# Patient Record
Sex: FEMALE | Race: WHITE | NOT HISPANIC OR LATINO | Employment: OTHER | URBAN - METROPOLITAN AREA
[De-identification: names, ages, dates, MRNs, and addresses within clinical notes are randomized per-mention and may not be internally consistent; named-entity substitution may affect disease eponyms.]

---

## 2019-02-14 ENCOUNTER — APPOINTMENT (OUTPATIENT)
Dept: MRI IMAGING | Facility: HOSPITAL | Age: 74
End: 2019-02-14

## 2019-02-14 ENCOUNTER — HOSPITAL ENCOUNTER (EMERGENCY)
Facility: HOSPITAL | Age: 74
Discharge: HOME | End: 2019-02-14
Attending: EMERGENCY MEDICINE | Admitting: EMERGENCY MEDICINE

## 2019-02-14 VITALS
BODY MASS INDEX: 28.17 KG/M2 | DIASTOLIC BLOOD PRESSURE: 82 MMHG | TEMPERATURE: 98.4 F | OXYGEN SATURATION: 96 % | SYSTOLIC BLOOD PRESSURE: 163 MMHG | HEIGHT: 64 IN | WEIGHT: 165 LBS | RESPIRATION RATE: 16 BRPM | HEART RATE: 61 BPM

## 2019-02-14 DIAGNOSIS — I10 ELEVATED BLOOD PRESSURE READING WITH DIAGNOSIS OF HYPERTENSION: Primary | ICD-10-CM

## 2019-02-14 DIAGNOSIS — G45.3 AMAUROSIS FUGAX OF LEFT EYE: ICD-10-CM

## 2019-02-14 LAB
ALBUMIN SERPL-MCNC: 4.15 G/DL (ref 3.2–4.8)
ALBUMIN/GLOB SERPL: 1.6 G/DL (ref 1.5–2.5)
ALP SERPL-CCNC: 96 U/L (ref 25–100)
ALT SERPL W P-5'-P-CCNC: 40 U/L (ref 7–40)
ANION GAP SERPL CALCULATED.3IONS-SCNC: 4 MMOL/L (ref 3–11)
AST SERPL-CCNC: 28 U/L (ref 0–33)
BACTERIA UR QL AUTO: ABNORMAL /HPF
BASOPHILS # BLD AUTO: 0.03 10*3/MM3 (ref 0–0.2)
BASOPHILS NFR BLD AUTO: 0.5 % (ref 0–1)
BILIRUB SERPL-MCNC: 0.7 MG/DL (ref 0.3–1.2)
BILIRUB UR QL STRIP: NEGATIVE
BUN BLD-MCNC: 13 MG/DL (ref 9–23)
BUN/CREAT SERPL: 15.3 (ref 7–25)
CALCIUM SPEC-SCNC: 10.2 MG/DL (ref 8.7–10.4)
CHLORIDE SERPL-SCNC: 106 MMOL/L (ref 99–109)
CLARITY UR: CLEAR
CO2 SERPL-SCNC: 30 MMOL/L (ref 20–31)
COLOR UR: YELLOW
CREAT BLD-MCNC: 0.85 MG/DL (ref 0.6–1.3)
DEPRECATED RDW RBC AUTO: 44.9 FL (ref 37–54)
EOSINOPHIL # BLD AUTO: 0.06 10*3/MM3 (ref 0–0.3)
EOSINOPHIL NFR BLD AUTO: 1.1 % (ref 0–3)
ERYTHROCYTE [DISTWIDTH] IN BLOOD BY AUTOMATED COUNT: 13.6 % (ref 11.3–14.5)
GFR SERPL CREATININE-BSD FRML MDRD: 65 ML/MIN/1.73
GLOBULIN UR ELPH-MCNC: 2.7 GM/DL
GLUCOSE BLD-MCNC: 94 MG/DL (ref 70–100)
GLUCOSE UR STRIP-MCNC: NEGATIVE MG/DL
HCT VFR BLD AUTO: 46.1 % (ref 34.5–44)
HGB BLD-MCNC: 15 G/DL (ref 11.5–15.5)
HGB UR QL STRIP.AUTO: NEGATIVE
HYALINE CASTS UR QL AUTO: ABNORMAL /LPF
IMM GRANULOCYTES # BLD AUTO: 0.01 10*3/MM3 (ref 0–0.05)
IMM GRANULOCYTES NFR BLD AUTO: 0.2 % (ref 0–0.6)
KETONES UR QL STRIP: NEGATIVE
LEUKOCYTE ESTERASE UR QL STRIP.AUTO: ABNORMAL
LYMPHOCYTES # BLD AUTO: 1.61 10*3/MM3 (ref 0.6–4.8)
LYMPHOCYTES NFR BLD AUTO: 29.5 % (ref 24–44)
MCH RBC QN AUTO: 29.5 PG (ref 27–31)
MCHC RBC AUTO-ENTMCNC: 32.5 G/DL (ref 32–36)
MCV RBC AUTO: 90.6 FL (ref 80–99)
MONOCYTES # BLD AUTO: 0.44 10*3/MM3 (ref 0–1)
MONOCYTES NFR BLD AUTO: 8.1 % (ref 0–12)
NEUTROPHILS # BLD AUTO: 3.32 10*3/MM3 (ref 1.5–8.3)
NEUTROPHILS NFR BLD AUTO: 60.8 % (ref 41–71)
NITRITE UR QL STRIP: NEGATIVE
PH UR STRIP.AUTO: 7.5 [PH] (ref 5–8)
PLATELET # BLD AUTO: 243 10*3/MM3 (ref 150–450)
PMV BLD AUTO: 12.3 FL (ref 6–12)
POTASSIUM BLD-SCNC: 4.2 MMOL/L (ref 3.5–5.5)
PROT SERPL-MCNC: 6.8 G/DL (ref 5.7–8.2)
PROT UR QL STRIP: NEGATIVE
RBC # BLD AUTO: 5.09 10*6/MM3 (ref 3.89–5.14)
RBC # UR: ABNORMAL /HPF
REF LAB TEST METHOD: ABNORMAL
SODIUM BLD-SCNC: 140 MMOL/L (ref 132–146)
SP GR UR STRIP: 1.01 (ref 1–1.03)
SQUAMOUS #/AREA URNS HPF: ABNORMAL /HPF
TROPONIN I SERPL-MCNC: <0.006 NG/ML
UROBILINOGEN UR QL STRIP: ABNORMAL
WBC NRBC COR # BLD: 5.46 10*3/MM3 (ref 3.5–10.8)
WBC UR QL AUTO: ABNORMAL /HPF

## 2019-02-14 PROCEDURE — 70548 MR ANGIOGRAPHY NECK W/DYE: CPT

## 2019-02-14 PROCEDURE — 93005 ELECTROCARDIOGRAM TRACING: CPT | Performed by: EMERGENCY MEDICINE

## 2019-02-14 PROCEDURE — 81001 URINALYSIS AUTO W/SCOPE: CPT | Performed by: EMERGENCY MEDICINE

## 2019-02-14 PROCEDURE — 70551 MRI BRAIN STEM W/O DYE: CPT

## 2019-02-14 PROCEDURE — 99284 EMERGENCY DEPT VISIT MOD MDM: CPT

## 2019-02-14 PROCEDURE — 0 GADOBENATE DIMEGLUMINE 529 MG/ML SOLUTION: Performed by: EMERGENCY MEDICINE

## 2019-02-14 PROCEDURE — A9577 INJ MULTIHANCE: HCPCS | Performed by: EMERGENCY MEDICINE

## 2019-02-14 PROCEDURE — 70544 MR ANGIOGRAPHY HEAD W/O DYE: CPT

## 2019-02-14 PROCEDURE — 85025 COMPLETE CBC W/AUTO DIFF WBC: CPT | Performed by: EMERGENCY MEDICINE

## 2019-02-14 PROCEDURE — 80053 COMPREHEN METABOLIC PANEL: CPT | Performed by: EMERGENCY MEDICINE

## 2019-02-14 PROCEDURE — 84484 ASSAY OF TROPONIN QUANT: CPT | Performed by: EMERGENCY MEDICINE

## 2019-02-14 RX ORDER — VALSARTAN 160 MG/1
160 TABLET ORAL ONCE
Status: COMPLETED | OUTPATIENT
Start: 2019-02-14 | End: 2019-02-14

## 2019-02-14 RX ORDER — LOSARTAN POTASSIUM 25 MG/1
50 TABLET ORAL 2 TIMES DAILY
Qty: 60 TABLET | Refills: 0 | Status: SHIPPED | OUTPATIENT
Start: 2019-02-14

## 2019-02-14 RX ORDER — LABETALOL HYDROCHLORIDE 5 MG/ML
20 INJECTION, SOLUTION INTRAVENOUS ONCE
Status: DISCONTINUED | OUTPATIENT
Start: 2019-02-14 | End: 2019-02-14 | Stop reason: HOSPADM

## 2019-02-14 RX ADMIN — GADOBENATE DIMEGLUMINE 15 ML: 529 INJECTION, SOLUTION INTRAVENOUS at 13:49

## 2019-02-14 RX ADMIN — VALSARTAN 160 MG: 160 TABLET, FILM COATED ORAL at 12:06

## 2019-02-14 NOTE — ED PROVIDER NOTES
"Subjective   Oralia Woodruff is a 73 y.o.female with a history of HTN who presents to the ED with complaints of hypertension. Mrs. Woodruff states that she has been off of blood pressure medication for quite some time since discontinuing Valsartan. She was dieting and exercising and she said she had been rather healthy until this past month. For the past month, she says her blood pressure has been \"out of bounds\" and she feels very \"shaky\" today. She also notes that her heart was quivering last night. She also mentions that she has had 2 syncopal episodes  in the past month.  She has had 2 episodes of darkening of the vision in her left eye. This later resolved. She followed up with an eye specialist in [place] two days ago and was told she had a blood clot behind her left eye. She is scheduled to have a study of her carotid artery tomorrow. The patient says she has had complications taking HTN medication in the past. Her blood pressure ranges from 160-170 systolic and 80-90 diastolic on a good day. She has a history of a cholecystectomy. She used to take a fluid pill. She does have a family history of CVA. There are no other acute complaints at this time.         History provided by:  Patient  Hypertension   Severity:  Moderate  Onset quality:  Gradual  Duration:  1 month  Timing:  Constant  Progression:  Worsening  Chronicity:  Recurrent  Time since last dose of antihypertensive: a long time.  Notable PTA blood pressures:  160-170/80-90 on a good day  Context: not OTC medications used    Relieved by:  Nothing  Ineffective treatments:  Diet  Associated symptoms: blurred vision (cloudy), loss of consciousness (twice in the past month), palpitations, syncope and vomiting        Review of Systems   Constitutional:        She feels \"shaky\"    Eyes: Positive for blurred vision (cloudy) and visual disturbance.   Cardiovascular: Positive for palpitations and syncope.   Gastrointestinal: Positive for vomiting. "   Neurological: Positive for loss of consciousness (twice in the past month) and syncope.   All other systems reviewed and are negative.      Past Medical History:   Diagnosis Date   • Hypertension        Allergies   Allergen Reactions   • Iodine Other (See Comments)     NO IDEA         Past Surgical History:   Procedure Laterality Date   • CHOLECYSTECTOMY     • EYE SURGERY     • HYSTERECTOMY         History reviewed. No pertinent family history.    Social History     Socioeconomic History   • Marital status:      Spouse name: Not on file   • Number of children: Not on file   • Years of education: Not on file   • Highest education level: Not on file   Tobacco Use   • Smoking status: Never Smoker   • Smokeless tobacco: Never Used   Substance and Sexual Activity   • Alcohol use: No     Frequency: Never   • Drug use: No   • Sexual activity: Defer         Objective   Physical Exam   Constitutional: She is oriented to person, place, and time. She appears well-developed and well-nourished. No distress.   HENT:   Head: Normocephalic and atraumatic.   Nose: Nose normal.   Eyes: Conjunctivae are normal. No scleral icterus.   Neck: Normal range of motion. Neck supple.   Cardiovascular: Normal rate, regular rhythm and normal heart sounds.   No murmur heard.  Pulmonary/Chest: Effort normal and breath sounds normal. No respiratory distress.   Abdominal: Soft. Bowel sounds are normal. There is no tenderness.   Musculoskeletal: Normal range of motion. She exhibits no edema.   Neurological: She is alert and oriented to person, place, and time.   Skin: Skin is warm and dry.   Psychiatric: She has a normal mood and affect. Her behavior is normal.   Nursing note and vitals reviewed.      Procedures         ED Course  ED Course as of Feb 14 2132   Thu Feb 14, 2019   1148 She reports 2 episodes of amaurosis fugax over the last month as well as several syncopal episodes.  We'll obtain MRI of her brain as well as MRI angiograms   [DT]   1407 Reevaluated Mrs. Woodruff at bedside and updated her on findings and plan for further care.   [TJ]   1425 I spoke with Mrs. Woodruff and her family about MRI findings.  Blood pressure is 169/89 with a heart rate of 62.  She is concerned about the potentially cancer causing ingredients and valsartan.  We will change her to losartan.  I encouraged her to begin taking a baby aspirin daily.  She reports that she has numerous sensitivities to most blood pressure medications.  We are awaiting troponin and electrolytes, once I reviewed those will discharge if no significant findings are seen  [DT]      ED Course User Index  [DT] Viet Gillette MD  [TJ] Luis Pedroza                     [facility]  Number of Diagnoses or Management Options  Amaurosis fugax of left eye: new and requires workup  Elevated blood pressure reading with diagnosis of hypertension: new and requires workup     Amount and/or Complexity of Data Reviewed  Clinical lab tests: ordered and reviewed  Tests in the radiology section of CPT®: ordered and reviewed  Independent visualization of images, tracings, or specimens: yes    Patient Progress  Patient progress: improved      Final diagnoses:   Elevated blood pressure reading with diagnosis of hypertension   Amaurosis fugax of left eye       Documentation assistance provided by fish Pedroza.  Information recorded by the fish was done at my direction and has been verified and validated by me.     Luis Pedroza  02/14/19 1152       Luis Pedroza  02/14/19 1312       Viet Gillette MD  02/14/19 [id number]

## 2019-02-14 NOTE — DISCHARGE INSTRUCTIONS
Begin taking a baby aspirin daily.  Monitor your blood pressure at home and report it to your primary care provider upon follow-up

## 2024-12-27 ENCOUNTER — APPOINTMENT (OUTPATIENT)
Dept: CT IMAGING | Facility: HOSPITAL | Age: 79
End: 2024-12-27
Payer: MEDICARE

## 2024-12-27 ENCOUNTER — HOSPITAL ENCOUNTER (INPATIENT)
Facility: HOSPITAL | Age: 79
LOS: 6 days | Discharge: HOME | End: 2025-01-03
Attending: EMERGENCY MEDICINE | Admitting: INTERNAL MEDICINE
Payer: MEDICARE

## 2024-12-27 DIAGNOSIS — R68.89 DECREASED ACTIVITY TOLERANCE: ICD-10-CM

## 2024-12-27 DIAGNOSIS — K86.89 PANCREATIC MASS: Primary | ICD-10-CM

## 2024-12-27 DIAGNOSIS — R10.9 ACUTE ABDOMINAL PAIN: ICD-10-CM

## 2024-12-27 DIAGNOSIS — R63.4 WEIGHT LOSS: ICD-10-CM

## 2024-12-27 LAB
ALBUMIN SERPL-MCNC: 3.9 G/DL (ref 3.5–5.2)
ALBUMIN/GLOB SERPL: 1.4 G/DL
ALP SERPL-CCNC: 440 U/L (ref 39–117)
ALT SERPL W P-5'-P-CCNC: 10 U/L (ref 1–33)
ANION GAP SERPL CALCULATED.3IONS-SCNC: 10 MMOL/L (ref 5–15)
AST SERPL-CCNC: 20 U/L (ref 1–32)
BACTERIA UR QL AUTO: NORMAL /HPF
BASOPHILS # BLD AUTO: 0.03 10*3/MM3 (ref 0–0.2)
BASOPHILS NFR BLD AUTO: 0.5 % (ref 0–1.5)
BILIRUB SERPL-MCNC: 0.3 MG/DL (ref 0–1.2)
BILIRUB UR QL STRIP: NEGATIVE
BUN SERPL-MCNC: 39 MG/DL (ref 8–23)
BUN/CREAT SERPL: 24.4 (ref 7–25)
CALCIUM SPEC-SCNC: 12.6 MG/DL (ref 8.6–10.5)
CHLORIDE SERPL-SCNC: 105 MMOL/L (ref 98–107)
CLARITY UR: CLEAR
CO2 SERPL-SCNC: 20 MMOL/L (ref 22–29)
COLOR UR: YELLOW
CREAT BLDA-MCNC: 1.7 MG/DL (ref 0.6–1.3)
CREAT SERPL-MCNC: 1.6 MG/DL (ref 0.57–1)
D-LACTATE SERPL-SCNC: 0.9 MMOL/L (ref 0.5–2)
DEPRECATED RDW RBC AUTO: 47.1 FL (ref 37–54)
EGFRCR SERPLBLD CKD-EPI 2021: 32.5 ML/MIN/1.73
EOSINOPHIL # BLD AUTO: 0.03 10*3/MM3 (ref 0–0.4)
EOSINOPHIL NFR BLD AUTO: 0.5 % (ref 0.3–6.2)
ERYTHROCYTE [DISTWIDTH] IN BLOOD BY AUTOMATED COUNT: 13.5 % (ref 12.3–15.4)
GEN 5 1HR TROPONIN T REFLEX: 17 NG/L
GLOBULIN UR ELPH-MCNC: 2.8 GM/DL
GLUCOSE SERPL-MCNC: 94 MG/DL (ref 65–99)
GLUCOSE UR STRIP-MCNC: ABNORMAL MG/DL
GRAN CASTS URNS QL MICRO: NORMAL /LPF
HCT VFR BLD AUTO: 37.5 % (ref 34–46.6)
HGB BLD-MCNC: 11.9 G/DL (ref 12–15.9)
HGB UR QL STRIP.AUTO: NEGATIVE
HOLD SPECIMEN: NORMAL
HYALINE CASTS UR QL AUTO: NORMAL /LPF
IMM GRANULOCYTES # BLD AUTO: 0.01 10*3/MM3 (ref 0–0.05)
IMM GRANULOCYTES NFR BLD AUTO: 0.2 % (ref 0–0.5)
KETONES UR QL STRIP: NEGATIVE
LEUKOCYTE ESTERASE UR QL STRIP.AUTO: NEGATIVE
LIPASE SERPL-CCNC: 86 U/L (ref 13–60)
LYMPHOCYTES # BLD AUTO: 1.37 10*3/MM3 (ref 0.7–3.1)
LYMPHOCYTES NFR BLD AUTO: 21.9 % (ref 19.6–45.3)
MCH RBC QN AUTO: 30.1 PG (ref 26.6–33)
MCHC RBC AUTO-ENTMCNC: 31.7 G/DL (ref 31.5–35.7)
MCV RBC AUTO: 94.7 FL (ref 79–97)
MONOCYTES # BLD AUTO: 0.38 10*3/MM3 (ref 0.1–0.9)
MONOCYTES NFR BLD AUTO: 6.1 % (ref 5–12)
NEUTROPHILS NFR BLD AUTO: 4.45 10*3/MM3 (ref 1.7–7)
NEUTROPHILS NFR BLD AUTO: 70.8 % (ref 42.7–76)
NITRITE UR QL STRIP: NEGATIVE
NRBC BLD AUTO-RTO: 0 /100 WBC (ref 0–0.2)
PH UR STRIP.AUTO: 6 [PH] (ref 5–8)
PLATELET # BLD AUTO: 207 10*3/MM3 (ref 140–450)
PMV BLD AUTO: 10.9 FL (ref 6–12)
POTASSIUM SERPL-SCNC: 4.7 MMOL/L (ref 3.5–5.2)
PROT SERPL-MCNC: 6.7 G/DL (ref 6–8.5)
PROT UR QL STRIP: ABNORMAL
RBC # BLD AUTO: 3.96 10*6/MM3 (ref 3.77–5.28)
RBC # UR STRIP: NORMAL /HPF
REF LAB TEST METHOD: NORMAL
SODIUM SERPL-SCNC: 135 MMOL/L (ref 136–145)
SP GR UR STRIP: 1.01 (ref 1–1.03)
SQUAMOUS #/AREA URNS HPF: NORMAL /HPF
TROPONIN T % DELTA: 6 %
TROPONIN T NUMERIC DELTA: 1 NG/L
TROPONIN T SERPL HS-MCNC: 16 NG/L
UROBILINOGEN UR QL STRIP: ABNORMAL
WBC # UR STRIP: NORMAL /HPF
WBC NRBC COR # BLD AUTO: 6.27 10*3/MM3 (ref 3.4–10.8)
WHOLE BLOOD HOLD COAG: NORMAL
WHOLE BLOOD HOLD SPECIMEN: NORMAL

## 2024-12-27 PROCEDURE — 99223 1ST HOSP IP/OBS HIGH 75: CPT | Performed by: STUDENT IN AN ORGANIZED HEALTH CARE EDUCATION/TRAINING PROGRAM

## 2024-12-27 PROCEDURE — G0378 HOSPITAL OBSERVATION PER HR: HCPCS

## 2024-12-27 PROCEDURE — 83605 ASSAY OF LACTIC ACID: CPT | Performed by: EMERGENCY MEDICINE

## 2024-12-27 PROCEDURE — 71275 CT ANGIOGRAPHY CHEST: CPT

## 2024-12-27 PROCEDURE — 93005 ELECTROCARDIOGRAM TRACING: CPT | Performed by: NURSE PRACTITIONER

## 2024-12-27 PROCEDURE — 63710000001 ONDANSETRON ODT 4 MG TABLET DISPERSIBLE: Performed by: EMERGENCY MEDICINE

## 2024-12-27 PROCEDURE — 85025 COMPLETE CBC W/AUTO DIFF WBC: CPT | Performed by: EMERGENCY MEDICINE

## 2024-12-27 PROCEDURE — 25510000001 IOPAMIDOL PER 1 ML: Performed by: EMERGENCY MEDICINE

## 2024-12-27 PROCEDURE — 25810000003 SODIUM CHLORIDE 0.9 % SOLUTION: Performed by: NURSE PRACTITIONER

## 2024-12-27 PROCEDURE — 84484 ASSAY OF TROPONIN QUANT: CPT | Performed by: NURSE PRACTITIONER

## 2024-12-27 PROCEDURE — 82565 ASSAY OF CREATININE: CPT

## 2024-12-27 PROCEDURE — 80053 COMPREHEN METABOLIC PANEL: CPT | Performed by: EMERGENCY MEDICINE

## 2024-12-27 PROCEDURE — 36415 COLL VENOUS BLD VENIPUNCTURE: CPT

## 2024-12-27 PROCEDURE — 86301 IMMUNOASSAY TUMOR CA 19-9: CPT | Performed by: STUDENT IN AN ORGANIZED HEALTH CARE EDUCATION/TRAINING PROGRAM

## 2024-12-27 PROCEDURE — 74177 CT ABD & PELVIS W/CONTRAST: CPT

## 2024-12-27 PROCEDURE — 99285 EMERGENCY DEPT VISIT HI MDM: CPT

## 2024-12-27 PROCEDURE — 83690 ASSAY OF LIPASE: CPT | Performed by: EMERGENCY MEDICINE

## 2024-12-27 PROCEDURE — 81001 URINALYSIS AUTO W/SCOPE: CPT | Performed by: EMERGENCY MEDICINE

## 2024-12-27 RX ORDER — ACETAMINOPHEN 650 MG/1
650 SUPPOSITORY RECTAL EVERY 4 HOURS PRN
Status: DISCONTINUED | OUTPATIENT
Start: 2024-12-27 | End: 2025-01-03 | Stop reason: HOSPADM

## 2024-12-27 RX ORDER — DEXAMETHASONE SODIUM PHOSPHATE 10 MG/ML
6 INJECTION INTRAMUSCULAR; INTRAVENOUS ONCE
Status: DISCONTINUED | OUTPATIENT
Start: 2024-12-27 | End: 2024-12-27

## 2024-12-27 RX ORDER — SODIUM CHLORIDE 9 MG/ML
40 INJECTION, SOLUTION INTRAVENOUS AS NEEDED
Status: DISCONTINUED | OUTPATIENT
Start: 2024-12-27 | End: 2025-01-03 | Stop reason: HOSPADM

## 2024-12-27 RX ORDER — SODIUM CHLORIDE 9 MG/ML
10 INJECTION, SOLUTION INTRAMUSCULAR; INTRAVENOUS; SUBCUTANEOUS AS NEEDED
Status: DISCONTINUED | OUTPATIENT
Start: 2024-12-27 | End: 2025-01-03 | Stop reason: HOSPADM

## 2024-12-27 RX ORDER — DIPHENHYDRAMINE HYDROCHLORIDE 50 MG/ML
12.5 INJECTION INTRAMUSCULAR; INTRAVENOUS ONCE
Status: DISCONTINUED | OUTPATIENT
Start: 2024-12-27 | End: 2024-12-28

## 2024-12-27 RX ORDER — ACETAMINOPHEN 160 MG/5ML
650 SOLUTION ORAL EVERY 4 HOURS PRN
Status: DISCONTINUED | OUTPATIENT
Start: 2024-12-27 | End: 2025-01-03 | Stop reason: HOSPADM

## 2024-12-27 RX ORDER — SODIUM CHLORIDE 0.9 % (FLUSH) 0.9 %
10 SYRINGE (ML) INJECTION AS NEEDED
Status: DISCONTINUED | OUTPATIENT
Start: 2024-12-27 | End: 2025-01-03 | Stop reason: HOSPADM

## 2024-12-27 RX ORDER — ONDANSETRON 4 MG/1
4 TABLET, ORALLY DISINTEGRATING ORAL ONCE
Status: COMPLETED | OUTPATIENT
Start: 2024-12-27 | End: 2024-12-27

## 2024-12-27 RX ORDER — ACETAMINOPHEN 325 MG/1
650 TABLET ORAL EVERY 4 HOURS PRN
Status: DISCONTINUED | OUTPATIENT
Start: 2024-12-27 | End: 2025-01-03 | Stop reason: HOSPADM

## 2024-12-27 RX ORDER — NITROGLYCERIN 0.4 MG/1
0.4 TABLET SUBLINGUAL
Status: DISCONTINUED | OUTPATIENT
Start: 2024-12-27 | End: 2025-01-03 | Stop reason: HOSPADM

## 2024-12-27 RX ORDER — FAMOTIDINE 10 MG/ML
20 INJECTION, SOLUTION INTRAVENOUS ONCE
Status: DISCONTINUED | OUTPATIENT
Start: 2024-12-27 | End: 2024-12-28

## 2024-12-27 RX ORDER — IOPAMIDOL 755 MG/ML
85 INJECTION, SOLUTION INTRAVASCULAR
Status: COMPLETED | OUTPATIENT
Start: 2024-12-27 | End: 2024-12-27

## 2024-12-27 RX ORDER — SODIUM CHLORIDE 0.9 % (FLUSH) 0.9 %
10 SYRINGE (ML) INJECTION EVERY 12 HOURS SCHEDULED
Status: DISCONTINUED | OUTPATIENT
Start: 2024-12-27 | End: 2025-01-03 | Stop reason: HOSPADM

## 2024-12-27 RX ORDER — HYDROCODONE BITARTRATE AND ACETAMINOPHEN 5; 325 MG/1; MG/1
1 TABLET ORAL ONCE
Status: COMPLETED | OUTPATIENT
Start: 2024-12-27 | End: 2024-12-27

## 2024-12-27 RX ORDER — ONDANSETRON 2 MG/ML
4 INJECTION INTRAMUSCULAR; INTRAVENOUS EVERY 6 HOURS PRN
Status: DISCONTINUED | OUTPATIENT
Start: 2024-12-27 | End: 2025-01-03 | Stop reason: HOSPADM

## 2024-12-27 RX ADMIN — HYDROCODONE BITARTRATE AND ACETAMINOPHEN 1 TABLET: 5; 325 TABLET ORAL at 16:38

## 2024-12-27 RX ADMIN — IOPAMIDOL 85 ML: 755 INJECTION, SOLUTION INTRAVENOUS at 18:22

## 2024-12-27 RX ADMIN — ONDANSETRON 4 MG: 4 TABLET, ORALLY DISINTEGRATING ORAL at 16:38

## 2024-12-27 RX ADMIN — SODIUM CHLORIDE 1000 ML: 9 INJECTION, SOLUTION INTRAVENOUS at 17:25

## 2024-12-27 NOTE — ED PROVIDER NOTES
Subjective   History of Present Illness  Pleasant patient presents the ER for severe right lower quad abdominal pain does radiate into her back.  She has had weight loss.  Symptoms have been going on for several weeks to months but getting much worse recently.  To the point where she has difficulty ambulating.  Accompanied by family.  Does not live close by.  Around 2 hours away.        Review of Systems    Past Medical History:   Diagnosis Date    Hypertension        No Active Allergies      Past Surgical History:   Procedure Laterality Date    CHOLECYSTECTOMY      EYE SURGERY      HYSTERECTOMY         No family history on file.    Social History     Socioeconomic History    Marital status:    Tobacco Use    Smoking status: Never    Smokeless tobacco: Never   Substance and Sexual Activity    Alcohol use: No    Drug use: No    Sexual activity: Defer           Objective   Physical Exam  Constitutional:       Appearance: She is well-developed.   HENT:      Head: Normocephalic and atraumatic.      Right Ear: External ear normal.      Left Ear: External ear normal.      Nose: Nose normal.   Eyes:      Conjunctiva/sclera: Conjunctivae normal.      Pupils: Pupils are equal, round, and reactive to light.   Cardiovascular:      Rate and Rhythm: Normal rate and regular rhythm.      Heart sounds: Normal heart sounds.   Pulmonary:      Effort: Pulmonary effort is normal.      Breath sounds: Normal breath sounds.   Abdominal:      General: Bowel sounds are normal.      Palpations: Abdomen is soft.      Tenderness:  in the right lower quadrant   Musculoskeletal:         General: Normal range of motion.      Cervical back: Normal range of motion and neck supple.   Skin:     General: Skin is warm and dry.   Neurological:      Mental Status: She is alert and oriented to person, place, and time.   Psychiatric:         Behavior: Behavior normal.         Thought Content: Thought content normal.         Judgment: Judgment  normal.         Procedures           ED Course  ED Course as of 12/27/24 2010   Fri Dec 27, 2024   1449 Broad differential.  Including bowel obstruction.  Mass colitis constipation.  Bone involvement.  Will need to get scans of her chest abdomen and pelvis lab work urinalysis etc.  Patient evaluated in pit. [JM]   1457 Pt in lobby 2 hours prior to my evaluation and orders. [JM]   1756 Patient has had IV contrast in the past without difficulty.  I had a great discussion with the patient and the family at the bedside.  I do think we need to get IV contrast for better evaluation although is concerned for things like cancer etc.  They do not want to be premedicated.  I think it is reasonable after shared decision making and risk benefits to scan her with IV contrast that she has had in the past without difficulty. [JM]   1957 I had a good conversation with the patient and her daughter.  Concern for pancreatic cancer.  Patient lives 2 hours away.  She is not moving around too much at home related to pain.  Plan on admitting her to the hospital for further evaluation. [JM]      ED Course User Index  [JM] Tristan Tyler, DAXA                                           CT Abdomen Pelvis With Contrast   Final Result   1.No pulmonary embolus.   2.No acute findings within the chest, abdomen or pelvis.   3.1.5 cm x 1.1 cm fluid attenuating cystic lesion of the pancreatic tail consistent with a cystic pancreatic neoplasm.   4.Colonic diverticulosis without evidence of diverticulitis.   5.Additional chronic findings as discussed above.            Electronically Signed: Evens Rojas MD     12/27/2024 6:40 PM EST     Workstation ID: [id number]      CT Angiogram Chest   Final Result   1.No pulmonary embolus.   2.No acute findings within the chest, abdomen or pelvis.   3.1.5 cm x 1.1 cm fluid attenuating cystic lesion of the pancreatic tail consistent with a cystic pancreatic neoplasm.   4.Colonic diverticulosis without evidence of  diverticulitis.   5.Additional chronic findings as discussed above.            Electronically Signed: Evens Rojas MD     12/27/2024 6:40 PM EST     Workstation ID: [id number]                    Medical Decision Making      Final diagnoses:   Pancreatic mass   Acute abdominal pain   Weight loss       ED Disposition  ED Disposition       ED Disposition   Decision to Admit    Condition   --    Comment   --               No follow-up provider specified.       Medication List      No changes were made to your prescriptions during this visit.            Tristan Tyler APRN  12/27/24 2010

## 2024-12-28 ENCOUNTER — APPOINTMENT (OUTPATIENT)
Dept: CARDIOLOGY | Facility: HOSPITAL | Age: 79
End: 2024-12-28
Payer: MEDICARE

## 2024-12-28 LAB
ANION GAP SERPL CALCULATED.3IONS-SCNC: 11 MMOL/L (ref 5–15)
APTT PPP: 49.2 SECONDS (ref 60–90)
ASCENDING AORTA: 3.6 CM
AV MEAN PRESS GRAD SYS DOP V1V2: 4.5 MMHG
AV VMAX SYS DOP: 166 CM/SEC
BASOPHILS # BLD AUTO: 0.03 10*3/MM3 (ref 0–0.2)
BASOPHILS # BLD AUTO: 0.03 10*3/MM3 (ref 0–0.2)
BASOPHILS NFR BLD AUTO: 0.5 % (ref 0–1.5)
BASOPHILS NFR BLD AUTO: 0.6 % (ref 0–1.5)
BH CV ECHO MEAS - AI P1/2T: 729.7 MSEC
BH CV ECHO MEAS - AO MAX PG: 11 MMHG
BH CV ECHO MEAS - AO ROOT DIAM: 3.1 CM
BH CV ECHO MEAS - AO V2 VTI: 30.7 CM
BH CV ECHO MEAS - AVA(I,D): 2.34 CM2
BH CV ECHO MEAS - EDV(CUBED): 85.2 ML
BH CV ECHO MEAS - EDV(MOD-SP2): 57.7 ML
BH CV ECHO MEAS - EDV(MOD-SP4): 98.1 ML
BH CV ECHO MEAS - EF(MOD-SP2): 62.2 %
BH CV ECHO MEAS - EF(MOD-SP4): 60 %
BH CV ECHO MEAS - ESV(CUBED): 8 ML
BH CV ECHO MEAS - ESV(MOD-SP2): 21.8 ML
BH CV ECHO MEAS - ESV(MOD-SP4): 39.2 ML
BH CV ECHO MEAS - FS: 54.5 %
BH CV ECHO MEAS - IVS/LVPW: 0.8 CM
BH CV ECHO MEAS - IVSD: 0.8 CM
BH CV ECHO MEAS - LA DIMENSION: 3.3 CM
BH CV ECHO MEAS - LAT PEAK E' VEL: 13.6 CM/SEC
BH CV ECHO MEAS - LV DIASTOLIC VOL/BSA (35-75): 64.7 CM2
BH CV ECHO MEAS - LV MASS(C)D: 128 GRAMS
BH CV ECHO MEAS - LV MAX PG: 6.1 MMHG
BH CV ECHO MEAS - LV MEAN PG: 2 MMHG
BH CV ECHO MEAS - LV SYSTOLIC VOL/BSA (12-30): 25.8 CM2
BH CV ECHO MEAS - LV V1 MAX: 123 CM/SEC
BH CV ECHO MEAS - LV V1 VTI: 25.3 CM
BH CV ECHO MEAS - LVIDD: 4.4 CM
BH CV ECHO MEAS - LVIDS: 2 CM
BH CV ECHO MEAS - LVOT AREA: 2.8 CM2
BH CV ECHO MEAS - LVOT DIAM: 1.9 CM
BH CV ECHO MEAS - LVPWD: 1 CM
BH CV ECHO MEAS - MED PEAK E' VEL: 9.2 CM/SEC
BH CV ECHO MEAS - MR MAX PG: 100.4 MMHG
BH CV ECHO MEAS - MR MAX VEL: 501 CM/SEC
BH CV ECHO MEAS - MR MEAN PG: 74 MMHG
BH CV ECHO MEAS - MR MEAN VEL: 409 CM/SEC
BH CV ECHO MEAS - MR VTI: 191 CM
BH CV ECHO MEAS - MV A MAX VEL: 127 CM/SEC
BH CV ECHO MEAS - MV DEC SLOPE: 423 CM/SEC2
BH CV ECHO MEAS - MV DEC TIME: 0.2 SEC
BH CV ECHO MEAS - MV E MAX VEL: 92 CM/SEC
BH CV ECHO MEAS - MV E/A: 0.72
BH CV ECHO MEAS - MV MAX PG: 5.1 MMHG
BH CV ECHO MEAS - MV MEAN PG: 2 MMHG
BH CV ECHO MEAS - MV P1/2T: 66.1 MSEC
BH CV ECHO MEAS - MV V2 VTI: 31.7 CM
BH CV ECHO MEAS - MVA(P1/2T): 3.3 CM2
BH CV ECHO MEAS - MVA(VTI): 2.26 CM2
BH CV ECHO MEAS - PA ACC TIME: 0.15 SEC
BH CV ECHO MEAS - PA V2 MAX: 111.5 CM/SEC
BH CV ECHO MEAS - PI END-D VEL: 99.3 CM/SEC
BH CV ECHO MEAS - SV(LVOT): 71.6 ML
BH CV ECHO MEAS - SV(MOD-SP2): 35.9 ML
BH CV ECHO MEAS - SV(MOD-SP4): 58.9 ML
BH CV ECHO MEAS - SVI(LVOT): 47.2 ML/M2
BH CV ECHO MEAS - SVI(MOD-SP2): 23.7 ML/M2
BH CV ECHO MEAS - SVI(MOD-SP4): 38.8 ML/M2
BH CV ECHO MEAS - TAPSE (>1.6): 1.74 CM
BH CV ECHO MEASUREMENTS AVERAGE E/E' RATIO: 8.07
BH CV LOW VAS LEFT SOLEAL VESSEL: 1
BH CV LOW VAS RIGHT PERONEAL VESSEL: 1
BH CV LOW VAS RIGHT POPLITEAL SPONT: 1
BH CV LOW VAS RIGHT VARICOSITY AK VESSEL: 1
BH CV LOWER VASCULAR LEFT COMMON FEMORAL AUGMENT: NORMAL
BH CV LOWER VASCULAR LEFT COMMON FEMORAL COMPRESS: NORMAL
BH CV LOWER VASCULAR LEFT COMMON FEMORAL PHASIC: NORMAL
BH CV LOWER VASCULAR LEFT COMMON FEMORAL SPONT: NORMAL
BH CV LOWER VASCULAR LEFT DISTAL FEMORAL AUGMENT: NORMAL
BH CV LOWER VASCULAR LEFT DISTAL FEMORAL COMPRESS: NORMAL
BH CV LOWER VASCULAR LEFT DISTAL FEMORAL PHASIC: NORMAL
BH CV LOWER VASCULAR LEFT DISTAL FEMORAL SPONT: NORMAL
BH CV LOWER VASCULAR LEFT GASTRONEMIUS COMPRESS: NORMAL
BH CV LOWER VASCULAR LEFT LESSER SAPH COMPRESS: NORMAL
BH CV LOWER VASCULAR LEFT MID FEMORAL AUGMENT: NORMAL
BH CV LOWER VASCULAR LEFT MID FEMORAL COMPRESS: NORMAL
BH CV LOWER VASCULAR LEFT MID FEMORAL PHASIC: NORMAL
BH CV LOWER VASCULAR LEFT MID FEMORAL SPONT: NORMAL
BH CV LOWER VASCULAR LEFT PERONEAL COMPRESS: NORMAL
BH CV LOWER VASCULAR LEFT POPLITEAL AUGMENT: NORMAL
BH CV LOWER VASCULAR LEFT POPLITEAL COMPRESS: NORMAL
BH CV LOWER VASCULAR LEFT POPLITEAL PHASIC: NORMAL
BH CV LOWER VASCULAR LEFT POPLITEAL SPONT: NORMAL
BH CV LOWER VASCULAR LEFT POSTERIOR TIBIAL COMPRESS: NORMAL
BH CV LOWER VASCULAR LEFT PROFUNDA FEMORAL PHASIC: NORMAL
BH CV LOWER VASCULAR LEFT PROFUNDA FEMORAL SPONT: NORMAL
BH CV LOWER VASCULAR LEFT PROXIMAL FEMORAL AUGMENT: NORMAL
BH CV LOWER VASCULAR LEFT PROXIMAL FEMORAL COMPRESS: NORMAL
BH CV LOWER VASCULAR LEFT PROXIMAL FEMORAL PHASIC: NORMAL
BH CV LOWER VASCULAR LEFT PROXIMAL FEMORAL SPONT: NORMAL
BH CV LOWER VASCULAR LEFT SAPHENOFEMORAL JUNCTION AUGMENT: NORMAL
BH CV LOWER VASCULAR LEFT SAPHENOFEMORAL JUNCTION COMPRESS: NORMAL
BH CV LOWER VASCULAR LEFT SAPHENOFEMORAL JUNCTION PHASIC: NORMAL
BH CV LOWER VASCULAR LEFT SAPHENOFEMORAL JUNCTION SPONT: NORMAL
BH CV LOWER VASCULAR LEFT SOLEAL COMPRESS: NORMAL
BH CV LOWER VASCULAR RIGHT COMMON FEMORAL AUGMENT: NORMAL
BH CV LOWER VASCULAR RIGHT COMMON FEMORAL COMPRESS: NORMAL
BH CV LOWER VASCULAR RIGHT COMMON FEMORAL PHASIC: NORMAL
BH CV LOWER VASCULAR RIGHT COMMON FEMORAL SPONT: NORMAL
BH CV LOWER VASCULAR RIGHT DISTAL FEMORAL AUGMENT: NORMAL
BH CV LOWER VASCULAR RIGHT DISTAL FEMORAL COMPRESS: NORMAL
BH CV LOWER VASCULAR RIGHT DISTAL FEMORAL PHASIC: NORMAL
BH CV LOWER VASCULAR RIGHT DISTAL FEMORAL SPONT: NORMAL
BH CV LOWER VASCULAR RIGHT GASTRONEMIUS COMPRESS: NORMAL
BH CV LOWER VASCULAR RIGHT GREATER SAPH AK COMPRESS: NORMAL
BH CV LOWER VASCULAR RIGHT GREATER SAPH BK COMPRESS: NORMAL
BH CV LOWER VASCULAR RIGHT MID FEMORAL AUGMENT: NORMAL
BH CV LOWER VASCULAR RIGHT MID FEMORAL COMPRESS: NORMAL
BH CV LOWER VASCULAR RIGHT MID FEMORAL PHASIC: NORMAL
BH CV LOWER VASCULAR RIGHT MID FEMORAL SPONT: NORMAL
BH CV LOWER VASCULAR RIGHT PERONEAL COMPRESS: NORMAL
BH CV LOWER VASCULAR RIGHT POPLITEAL COMPRESS: NORMAL
BH CV LOWER VASCULAR RIGHT POPLITEAL PHASIC: NORMAL
BH CV LOWER VASCULAR RIGHT POPLITEAL SPONT: NORMAL
BH CV LOWER VASCULAR RIGHT POSTERIOR TIBIAL COMPRESS: NORMAL
BH CV LOWER VASCULAR RIGHT PROFUNDA FEMORAL PHASIC: NORMAL
BH CV LOWER VASCULAR RIGHT PROFUNDA FEMORAL SPONT: NORMAL
BH CV LOWER VASCULAR RIGHT PROXIMAL FEMORAL AUGMENT: NORMAL
BH CV LOWER VASCULAR RIGHT PROXIMAL FEMORAL COMPRESS: NORMAL
BH CV LOWER VASCULAR RIGHT PROXIMAL FEMORAL PHASIC: NORMAL
BH CV LOWER VASCULAR RIGHT PROXIMAL FEMORAL SPONT: NORMAL
BH CV LOWER VASCULAR RIGHT SAPHENOFEMORAL JUNCTION AUGMENT: NORMAL
BH CV LOWER VASCULAR RIGHT SAPHENOFEMORAL JUNCTION COMPRESS: NORMAL
BH CV LOWER VASCULAR RIGHT SAPHENOFEMORAL JUNCTION PHASIC: NORMAL
BH CV LOWER VASCULAR RIGHT SAPHENOFEMORAL JUNCTION SPONT: NORMAL
BH CV LOWER VASCULAR RIGHT VARICOSITY AK COMPRESS: NORMAL
BH CV VAS PRELIMINARY FINDINGS SCRIPTING: 1
BH CV XLRA - RV BASE: 2.6 CM
BH CV XLRA - RV LENGTH: 7.4 CM
BH CV XLRA - RV MID: 1.6 CM
BH CV XLRA - TDI S': 14 CM/SEC
BUN SERPL-MCNC: 36 MG/DL (ref 8–23)
BUN/CREAT SERPL: 23.8 (ref 7–25)
CALCIUM SPEC-SCNC: 12.1 MG/DL (ref 8.6–10.5)
CANCER AG19-9 SERPL-ACNC: 41 U/ML
CHLORIDE SERPL-SCNC: 108 MMOL/L (ref 98–107)
CO2 SERPL-SCNC: 19 MMOL/L (ref 22–29)
CREAT SERPL-MCNC: 1.51 MG/DL (ref 0.57–1)
DEPRECATED RDW RBC AUTO: 46 FL (ref 37–54)
DEPRECATED RDW RBC AUTO: 47.8 FL (ref 37–54)
EGFRCR SERPLBLD CKD-EPI 2021: 34.8 ML/MIN/1.73
EOSINOPHIL # BLD AUTO: 0.1 10*3/MM3 (ref 0–0.4)
EOSINOPHIL # BLD AUTO: 0.12 10*3/MM3 (ref 0–0.4)
EOSINOPHIL NFR BLD AUTO: 2 % (ref 0.3–6.2)
EOSINOPHIL NFR BLD AUTO: 2.1 % (ref 0.3–6.2)
ERYTHROCYTE [DISTWIDTH] IN BLOOD BY AUTOMATED COUNT: 13.3 % (ref 12.3–15.4)
ERYTHROCYTE [DISTWIDTH] IN BLOOD BY AUTOMATED COUNT: 13.5 % (ref 12.3–15.4)
GLUCOSE SERPL-MCNC: 87 MG/DL (ref 65–99)
HCT VFR BLD AUTO: 30.8 % (ref 34–46.6)
HCT VFR BLD AUTO: 32.7 % (ref 34–46.6)
HGB BLD-MCNC: 10.2 G/DL (ref 12–15.9)
HGB BLD-MCNC: 9.8 G/DL (ref 12–15.9)
IMM GRANULOCYTES # BLD AUTO: 0.01 10*3/MM3 (ref 0–0.05)
IMM GRANULOCYTES # BLD AUTO: 0.01 10*3/MM3 (ref 0–0.05)
IMM GRANULOCYTES NFR BLD AUTO: 0.2 % (ref 0–0.5)
IMM GRANULOCYTES NFR BLD AUTO: 0.2 % (ref 0–0.5)
INR PPP: 1.12 (ref 0.89–1.12)
IVRT: 92 MS
LEFT ATRIUM VOLUME INDEX: 39 ML/M2
LV EF BIPLANE MOD: 59.2 %
LYMPHOCYTES # BLD AUTO: 1.68 10*3/MM3 (ref 0.7–3.1)
LYMPHOCYTES # BLD AUTO: 1.74 10*3/MM3 (ref 0.7–3.1)
LYMPHOCYTES NFR BLD AUTO: 31 % (ref 19.6–45.3)
LYMPHOCYTES NFR BLD AUTO: 33.8 % (ref 19.6–45.3)
MCH RBC QN AUTO: 30 PG (ref 26.6–33)
MCH RBC QN AUTO: 30 PG (ref 26.6–33)
MCHC RBC AUTO-ENTMCNC: 31.2 G/DL (ref 31.5–35.7)
MCHC RBC AUTO-ENTMCNC: 31.8 G/DL (ref 31.5–35.7)
MCV RBC AUTO: 94.2 FL (ref 79–97)
MCV RBC AUTO: 96.2 FL (ref 79–97)
MONOCYTES # BLD AUTO: 0.42 10*3/MM3 (ref 0.1–0.9)
MONOCYTES # BLD AUTO: 0.46 10*3/MM3 (ref 0.1–0.9)
MONOCYTES NFR BLD AUTO: 7.5 % (ref 5–12)
MONOCYTES NFR BLD AUTO: 9.3 % (ref 5–12)
NEUTROPHILS NFR BLD AUTO: 2.69 10*3/MM3 (ref 1.7–7)
NEUTROPHILS NFR BLD AUTO: 3.3 10*3/MM3 (ref 1.7–7)
NEUTROPHILS NFR BLD AUTO: 54.1 % (ref 42.7–76)
NEUTROPHILS NFR BLD AUTO: 58.7 % (ref 42.7–76)
NRBC BLD AUTO-RTO: 0 /100 WBC (ref 0–0.2)
NRBC BLD AUTO-RTO: 0 /100 WBC (ref 0–0.2)
PLATELET # BLD AUTO: 193 10*3/MM3 (ref 140–450)
PLATELET # BLD AUTO: 194 10*3/MM3 (ref 140–450)
PMV BLD AUTO: 10.8 FL (ref 6–12)
PMV BLD AUTO: 11.5 FL (ref 6–12)
POTASSIUM SERPL-SCNC: 4.1 MMOL/L (ref 3.5–5.2)
PROTHROMBIN TIME: 14.5 SECONDS (ref 12.2–14.5)
RBC # BLD AUTO: 3.27 10*6/MM3 (ref 3.77–5.28)
RBC # BLD AUTO: 3.4 10*6/MM3 (ref 3.77–5.28)
SODIUM SERPL-SCNC: 138 MMOL/L (ref 136–145)
UFH PPP CHRO-ACNC: 0.11 IU/ML (ref 0.3–0.7)
WBC NRBC COR # BLD AUTO: 4.97 10*3/MM3 (ref 3.4–10.8)
WBC NRBC COR # BLD AUTO: 5.62 10*3/MM3 (ref 3.4–10.8)

## 2024-12-28 PROCEDURE — 85610 PROTHROMBIN TIME: CPT | Performed by: INTERNAL MEDICINE

## 2024-12-28 PROCEDURE — 86300 IMMUNOASSAY TUMOR CA 15-3: CPT | Performed by: INTERNAL MEDICINE

## 2024-12-28 PROCEDURE — 84165 PROTEIN E-PHORESIS SERUM: CPT | Performed by: INTERNAL MEDICINE

## 2024-12-28 PROCEDURE — 93306 TTE W/DOPPLER COMPLETE: CPT

## 2024-12-28 PROCEDURE — 93970 EXTREMITY STUDY: CPT | Performed by: INTERNAL MEDICINE

## 2024-12-28 PROCEDURE — 84155 ASSAY OF PROTEIN SERUM: CPT | Performed by: INTERNAL MEDICINE

## 2024-12-28 PROCEDURE — 82378 CARCINOEMBRYONIC ANTIGEN: CPT | Performed by: INTERNAL MEDICINE

## 2024-12-28 PROCEDURE — 80048 BASIC METABOLIC PNL TOTAL CA: CPT | Performed by: STUDENT IN AN ORGANIZED HEALTH CARE EDUCATION/TRAINING PROGRAM

## 2024-12-28 PROCEDURE — 93970 EXTREMITY STUDY: CPT

## 2024-12-28 PROCEDURE — 86304 IMMUNOASSAY TUMOR CA 125: CPT | Performed by: INTERNAL MEDICINE

## 2024-12-28 PROCEDURE — 25010000002 ZOLEDRONIC ACID 4 MG/100ML SOLUTION: Performed by: INTERNAL MEDICINE

## 2024-12-28 PROCEDURE — 99222 1ST HOSP IP/OBS MODERATE 55: CPT | Performed by: INTERNAL MEDICINE

## 2024-12-28 PROCEDURE — 82397 CHEMILUMINESCENT ASSAY: CPT | Performed by: INTERNAL MEDICINE

## 2024-12-28 PROCEDURE — 85730 THROMBOPLASTIN TIME PARTIAL: CPT | Performed by: INTERNAL MEDICINE

## 2024-12-28 PROCEDURE — 85520 HEPARIN ASSAY: CPT | Performed by: INTERNAL MEDICINE

## 2024-12-28 PROCEDURE — 25010000002 HEPARIN (PORCINE) 25000-0.45 UT/250ML-% SOLUTION: Performed by: INTERNAL MEDICINE

## 2024-12-28 PROCEDURE — 93306 TTE W/DOPPLER COMPLETE: CPT | Performed by: INTERNAL MEDICINE

## 2024-12-28 PROCEDURE — 85025 COMPLETE CBC W/AUTO DIFF WBC: CPT | Performed by: STUDENT IN AN ORGANIZED HEALTH CARE EDUCATION/TRAINING PROGRAM

## 2024-12-28 PROCEDURE — 85025 COMPLETE CBC W/AUTO DIFF WBC: CPT | Performed by: INTERNAL MEDICINE

## 2024-12-28 PROCEDURE — 83521 IG LIGHT CHAINS FREE EACH: CPT | Performed by: INTERNAL MEDICINE

## 2024-12-28 RX ORDER — HEPARIN SODIUM 1000 [USP'U]/ML
50 INJECTION, SOLUTION INTRAVENOUS; SUBCUTANEOUS AS NEEDED
Status: DISCONTINUED | OUTPATIENT
Start: 2024-12-28 | End: 2024-12-28

## 2024-12-28 RX ORDER — HEPARIN SODIUM 10000 [USP'U]/100ML
21 INJECTION, SOLUTION INTRAVENOUS
Status: DISCONTINUED | OUTPATIENT
Start: 2024-12-28 | End: 2025-01-02

## 2024-12-28 RX ORDER — HEPARIN SODIUM 1000 [USP'U]/ML
25 INJECTION, SOLUTION INTRAVENOUS; SUBCUTANEOUS AS NEEDED
Status: DISCONTINUED | OUTPATIENT
Start: 2024-12-28 | End: 2024-12-28

## 2024-12-28 RX ORDER — ZOLEDRONIC ACID 0.04 MG/ML
4 INJECTION, SOLUTION INTRAVENOUS ONCE
Status: COMPLETED | OUTPATIENT
Start: 2024-12-28 | End: 2024-12-28

## 2024-12-28 RX ADMIN — Medication 10 ML: at 09:02

## 2024-12-28 RX ADMIN — ZOLEDRONIC ACID 4 MG: 0.04 INJECTION, SOLUTION INTRAVENOUS at 22:01

## 2024-12-28 RX ADMIN — HEPARIN SODIUM 18 UNITS/KG/HR: 10000 INJECTION, SOLUTION INTRAVENOUS at 18:14

## 2024-12-28 NOTE — PROGRESS NOTES
[facility] Medicine Services  PROGRESS NOTE    Patient Name: Oralia Woodruff  : 1945  MRN: [id number]    Date of Admission: 2024  Primary Care Physician: Yanni Olsen MD    Subjective   Subjective     CC:  Weight loss    HPI:  Discussed changing to lovenox but she wants to stay on heparin gtt  Spoke with her daughter over the phone.  at bedside      Objective   Objective     Vital Signs:   Temp:  [97 °F (36.1 °C)-98.8 °F (37.1 °C)] 97 °F (36.1 °C)  Heart Rate:  [61-69] 68  Resp:  [16-18] 16  BP: (131-159)/(67-76) 134/69     Physical Exam:  Constitutional: No acute distress, awake, alert  HENT: NCAT, mucous membranes moist  Respiratory: Respiratory effort normal   Cardiovascular: RRR, no murmurs, rubs, or gallops  Gastrointestinal: soft, non-tender  Musculoskeletal: LE edema  Psychiatric: Appropriate affect, cooperative  Neurologic: Oriented x 3, speech clear  Skin: No rashes      Results Reviewed:  LAB RESULTS:      Lab 24  1105 24  0044 24  2144 24  2140 24  0411 24  1839 24  1638 24  1517   WBC 4.28  --   --  5.62 4.97  --   --  6.27   HEMOGLOBIN 10.2*  --   --  9.8* 10.2*  --   --  11.9*   HEMATOCRIT 31.8*  --   --  30.8* 32.7*  --   --  37.5   PLATELETS 195  --   --  193 194  --   --  207   NEUTROS ABS 2.60  --   --  3.30 2.69  --   --  4.45   IMMATURE GRANS (ABS) 0.01  --   --  0.01 0.01  --   --  0.01   LYMPHS ABS 1.23  --   --  1.74 1.68  --   --  1.37   MONOS ABS 0.33  --   --  0.42 0.46  --   --  0.38   EOS ABS 0.09  --   --  0.12 0.10  --   --  0.03   MCV 94.4  --   --  94.2 96.2  --   --  94.7   LACTATE  --   --   --   --   --   --   --  0.9   PROTIME  --   --  14.5  --   --   --   --   --    APTT  --   --  49.2*  --   --   --   --   --    HEPARIN ANTI-XA 0.45 0.25* 0.11*  --   --   --   --   --    HSTROP T  --   --   --   --   --  17* 16*  --          Lab 24  1105 24  0411 24  8243  12/27/24  1638   SODIUM 140 138  --  135*   POTASSIUM 4.6 4.1  --  4.7   CHLORIDE 110* 108*  --  105   CO2 20.0* 19.0*  --  20.0*   ANION GAP 10.0 11.0  --  10.0   BUN 30* 36*  --  39*   CREATININE 1.70* 1.51* 1.70* 1.60*   EGFR 30.2* 34.8*  --  32.5*   GLUCOSE 97 87  --  94   CALCIUM 11.2* 12.1*  --  12.6*   IONIZED CALCIUM 1.49*  --   --   --          Lab 12/29/24  1105 12/27/24  1638   TOTAL PROTEIN 5.5* 6.7   ALBUMIN 3.4* 3.9   GLOBULIN 2.1 2.8   ALT (SGPT) 9 10   AST (SGOT) 18 20   BILIRUBIN <0.2 0.3   ALK PHOS 388* 440*   LIPASE  --  86*         Lab 12/28/24  2144 12/27/24  1839 12/27/24  1638   HSTROP T  --  17* 16*   PROTIME 14.5  --   --    INR 1.12  --   --              Lab 12/29/24  1105   IRON 71   IRON SATURATION (TSAT) 29   TIBC 246*   TRANSFERRIN 165*         Brief Urine Lab Results  (Last result in the past 365 days)        Color   Clarity   Blood   Leuk Est   Nitrite   Protein   CREAT   Urine HCG        12/27/24 1518 Yellow   Clear   Negative   Negative   Negative   30 mg/dL (1+)                   Microbiology Results Abnormal       None            Duplex Venous Lower Extremity - Bilateral CAR    Result Date: 12/28/2024    Acute right lower extremity deep vein thrombosis noted in the popliteal and peroneal.   Acute left lower extremity deep vein thrombosis noted in the soleal.   All other veins appeared normal bilaterally.     CT Abdomen Pelvis With Contrast    Result Date: 12/27/2024  CT ANGIOGRAM CHEST, CT ABDOMEN PELVIS W CONTRAST Date of Exam: 12/27/2024 6:14 PM EST Indication: pe. weight loss.. Comparison: None available. Technique: CTA of the chest and CT of the abdomen and pelvis were performed after the uneventful intravenous administration of 85 cc Isovue-370 IV contrast . Reconstructed coronal and sagittal images were also obtained. In addition, a 3-D volume rendered  image was created for interpretation. Automated exposure control and iterative reconstruction methods were used. Findings:  Symmetric thyroid enlargement. No consolidation. No pneumothorax or pleural effusion. The heart and pericardium are normal. No evidence of aortic dissection. Atheromatous disease of the aortic arch. No pulmonary embolus. No mediastinal, perihilar, or axillary adenopathy. Moderate chronic anterior wedging of T6. Degenerative endplate changes. Osteopenia. Multilevel mild to space narrowing. Vacuum disc phenomenon. The spinous processes are intact. No rib fractures are seen. Liver: Multiple fluid attenuating hepatic cysts measuring up to 3.3 cm. Spleen:No masses. No perisplenic hematoma. Pancreas: 1.5 cm x 1.1 cm fluid attenuating cystic lesion of the pancreatic tail (consistent with a cystic pancreatic neoplasm. Gallbladder and common bile duct: Previous cholecystectomy. Adrenal glands:No adrenal masses Kidneys and ureters: 0.8 cm left renal cyst. No calculi present within the ureters. Normal caliber ureters. Urinary bladder:No urinary bladder wall thickening. No bladder masses. Small bowel:Normal caliber small bowel. Large bowel: Colonic diverticulosis without evidence of diverticulitis. Appendix: Not seen however there is no evidence of appendicitis GENITOURINARY: Previous hysterectomy Ascites or pneumoperitoneum:None. Adenopathy:None present Osseous structures: The pubic bones are intact. The proximal femurs are intact. Degenerative changes of the hips. Bilateral hip joint effusions without capsular enhancement. The sacrum and sacroiliac joints are normal. Scoliotic curvature of the spine. Grade 2 anterolisthesis of L4 on L5 with severe degenerative changes. No spondylolysis. Other findings: Atheromatous disease of the abdominal aorta and visualized branches.     Impression: 1.No pulmonary embolus. 2.No acute findings within the chest, abdomen or pelvis. 3.1.5 cm x 1.1 cm fluid attenuating cystic lesion of the pancreatic tail consistent with a cystic pancreatic neoplasm. 4.Colonic diverticulosis without evidence of  diverticulitis. 5.Additional chronic findings as discussed above. Electronically Signed: Evens Rojas MD  12/27/2024 6:40 PM EST  Workstation ID: [id number]    CT Angiogram Chest    Result Date: 12/27/2024  CT ANGIOGRAM CHEST, CT ABDOMEN PELVIS W CONTRAST Date of Exam: 12/27/2024 6:14 PM EST Indication: pe. weight loss.. Comparison: None available. Technique: CTA of the chest and CT of the abdomen and pelvis were performed after the uneventful intravenous administration of 85 cc Isovue-370 IV contrast . Reconstructed coronal and sagittal images were also obtained. In addition, a 3-D volume rendered  image was created for interpretation. Automated exposure control and iterative reconstruction methods were used. Findings: Symmetric thyroid enlargement. No consolidation. No pneumothorax or pleural effusion. The heart and pericardium are normal. No evidence of aortic dissection. Atheromatous disease of the aortic arch. No pulmonary embolus. No mediastinal, perihilar, or axillary adenopathy. Moderate chronic anterior wedging of T6. Degenerative endplate changes. Osteopenia. Multilevel mild to space narrowing. Vacuum disc phenomenon. The spinous processes are intact. No rib fractures are seen. Liver: Multiple fluid attenuating hepatic cysts measuring up to 3.3 cm. Spleen:No masses. No perisplenic hematoma. Pancreas: 1.5 cm x 1.1 cm fluid attenuating cystic lesion of the pancreatic tail (consistent with a cystic pancreatic neoplasm. Gallbladder and common bile duct: Previous cholecystectomy. Adrenal glands:No adrenal masses Kidneys and ureters: 0.8 cm left renal cyst. No calculi present within the ureters. Normal caliber ureters. Urinary bladder:No urinary bladder wall thickening. No bladder masses. Small bowel:Normal caliber small bowel. Large bowel: Colonic diverticulosis without evidence of diverticulitis. Appendix: Not seen however there is no evidence of appendicitis GENITOURINARY: Previous hysterectomy Ascites or  pneumoperitoneum:None. Adenopathy:None present Osseous structures: The pubic bones are intact. The proximal femurs are intact. Degenerative changes of the hips. Bilateral hip joint effusions without capsular enhancement. The sacrum and sacroiliac joints are normal. Scoliotic curvature of the spine. Grade 2 anterolisthesis of L4 on L5 with severe degenerative changes. No spondylolysis. Other findings: Atheromatous disease of the abdominal aorta and visualized branches.     Impression: 1.No pulmonary embolus. 2.No acute findings within the chest, abdomen or pelvis. 3.1.5 cm x 1.1 cm fluid attenuating cystic lesion of the pancreatic tail consistent with a cystic pancreatic neoplasm. 4.Colonic diverticulosis without evidence of diverticulitis. 5.Additional chronic findings as discussed above. Electronically Signed: Evens Rojas MD  12/27/2024 6:40 PM EST  Workstation ID: [id number]     Results for orders placed during the hospital encounter of 12/27/24    Adult Transthoracic Echo Complete W/ Cont if Necessary Per Protocol    Interpretation Summary    Left ventricular systolic function is normal. Calculated left ventricular EF = 59.2% Left ventricular ejection fraction appears to be 56 - 60%.    Left ventricular diastolic function was normal.      Current medications:  Scheduled Meds:sodium chloride, 10 mL, Intravenous, Q12H      Continuous Infusions:heparin, 18 Units/kg/hr, Last Rate: 18 Units/kg/hr (12/28/24 1814)  Pharmacy to Dose Heparin,       PRN Meds:.  acetaminophen **OR** acetaminophen **OR** acetaminophen    Calcium Replacement - Follow Nurse / BPA Driven Protocol    Magnesium Standard Dose Replacement - Follow Nurse / BPA Driven Protocol    nitroglycerin    ondansetron    Pharmacy to Dose Heparin    Phosphorus Replacement - Follow Nurse / BPA Driven Protocol    Potassium Replacement - Follow Nurse / BPA Driven Protocol    Sodium Chloride (PF)    sodium chloride    sodium chloride    Assessment & Plan    Assessment & Plan     Active Hospital Problems    Diagnosis  POA    **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        Brief Hospital Course to date:  Oralia Woodruff is a 79 y.o. female with history of hypertension admitted for failure to thrive and significant weight loss.  She was brought in by her daughter who is visiting over the holiday.  CT abdomen/pelvis concerning for a cystic lesion of the pancreas neoplasm. Workup in progress. Oncology following     Abdominal pain  Weight loss  Pancreatic cystic lesion  -Concern for malignancy, oncology consulted  -CA 19-9 elevated  -Other tumor markers pending  -Bone scan ordered, pending     Mild hypercalcemia  -Improved with IVF  -S/p Zometa times 1 on 12/28  -spep and bone scan pending     RLE pain  R LE and LLE DVT  -Acute right lower extremity deep vein thrombosis noted in the popliteal and peroneal.  - Acute left lower extremity deep vein thrombosis noted in the soleal  -heparin drip started 12/28     Renal insufficiency  Unsure of baseline CKD     Anemia, normocytic     Debility, weakness  -PT/OT     Labs still pending, some not yet collected  Will follow up on labs when available      Expected Discharge Location and Transportation: [facility]  Expected Discharge   Expected Discharge Date: 12/31/2024; Expected Discharge Time:      VTE Prophylaxis:  Pharmacologic & mechanical VTE prophylaxis orders are present.         AM-PAC 6 Clicks Score (PT): 15 (12/29/24 4530)    CODE STATUS:   Code Status and Medical Interventions: CPR (Attempt to Resuscitate); Full Support   Ordered at: 12/27/24 7465     Code Status (Patient has no pulse and is not breathing):    CPR (Attempt to Resuscitate)     Medical Interventions (Patient has pulse or is breathing):    Full Support       Lilly Awad, DO  12/29/24

## 2024-12-28 NOTE — ED NOTES
" Oralia Woodruff    Nursing Report ED to Floor:  Mental status: AOx4  Ambulatory status: Assist x2  Oxygen Therapy:  RA  Cardiac Rhythm: NSR  Admitted from: Home  Safety Concerns:  Fall risk   Social Issues: None  ED Room #:  14    ED Nurse Phone Extension - [phone] or may call [phone].      HPI:   Chief Complaint   Patient presents with    Abdominal Pain       Past Medical History:  Past Medical History:   Diagnosis Date    Hypertension         Past Surgical History:  Past Surgical History:   Procedure Laterality Date    CHOLECYSTECTOMY      EYE SURGERY      HYSTERECTOMY          Admitting Doctor:   No admitting provider for patient encounter.    Consulting Provider(s):  Consults       No orders found from 11/28/2024 to 12/28/2024.             Admitting Diagnosis:   The primary encounter diagnosis was Pancreatic mass. Diagnoses of Acute abdominal pain and Weight loss were also pertinent to this visit.    Most Recent Vitals:   Vitals:    12/27/24 1211 12/27/24 1700 12/27/24 1730   BP: 156/87 157/76 144/72   BP Location: Right arm     Patient Position: Sitting     Pulse: 73 57 64   Resp: 16     Temp: 98.3 °F (36.8 °C)     TempSrc: Oral     SpO2: 94% 99% 98%   Weight: 49.9 kg (110 lb)     Height: 162.6 cm (64\")         Active LDAs/IV Access:   Lines, Drains & Airways       Active LDAs       Name Placement date Placement time Site Days    Peripheral IV 12/27/24 1519 Right Antecubital 12/27/24  1519  Antecubital  less than 1                    Labs (abnormal labs have a star):   Labs Reviewed   COMPREHENSIVE METABOLIC PANEL - Abnormal; Notable for the following components:       Result Value    BUN 39 (*)     Creatinine 1.60 (*)     Sodium 135 (*)     CO2 20.0 (*)     Calcium 12.6 (*)     Alkaline Phosphatase 440 (*)     eGFR 32.5 (*)     All other components within normal limits    Narrative:     GFR Categories in Chronic Kidney Disease (CKD)      GFR Category          GFR (mL/min/1.73)    Interpretation  G1                   "   90 or greater         Normal or high (1)  G2                      60-89                Mild decrease (1)  G3a                   45-59                Mild to moderate decrease  G3b                   30-44                Moderate to severe decrease  G4                    15-29                Severe decrease  G5                    14 or less           Kidney failure          (1)In the absence of evidence of kidney disease, neither GFR category G1 or G2 fulfill the criteria for CKD.    eGFR calculation 2021 CKD-EPI creatinine equation, which does not include race as a factor   LIPASE - Abnormal; Notable for the following components:    Lipase 86 (*)     All other components within normal limits   URINALYSIS W/ MICROSCOPIC IF INDICATED (NO CULTURE) - Abnormal; Notable for the following components:    Glucose,  mg/dL (Trace) (*)     Protein, UA 30 mg/dL (1+) (*)     All other components within normal limits   CBC WITH AUTO DIFFERENTIAL - Abnormal; Notable for the following components:    Hemoglobin 11.9 (*)     All other components within normal limits   TROPONIN - Abnormal; Notable for the following components:    HS Troponin T 16 (*)     All other components within normal limits    Narrative:     High Sensitive Troponin T Reference Range:  <14.0 ng/L- Negative Female for AMI  <22.0 ng/L- Negative Male for AMI  >=14 - Abnormal Female indicating possible myocardial injury.  >=22 - Abnormal Male indicating possible myocardial injury.   Clinicians would have to utilize clinical acumen, EKG, Troponin, and serial changes to determine if it is an Acute Myocardial Infarction or myocardial injury due to an underlying chronic condition.        HIGH SENSITIVITIY TROPONIN T 1HR - Abnormal; Notable for the following components:    HS Troponin T 17 (*)     All other components within normal limits    Narrative:     High Sensitive Troponin T Reference Range:  <14.0 ng/L- Negative Female for AMI  <22.0 ng/L- Negative Male for  AMI  >=14 - Abnormal Female indicating possible myocardial injury.  >=22 - Abnormal Male indicating possible myocardial injury.   Clinicians would have to utilize clinical acumen, EKG, Troponin, and serial changes to determine if it is an Acute Myocardial Infarction or myocardial injury due to an underlying chronic condition.        POCT CREATININE - Abnormal; Notable for the following components:    Creatinine 1.70 (*)     All other components within normal limits   LACTIC ACID, PLASMA - Normal   RAINBOW DRAW    Narrative:     The following orders were created for panel order [facility] Draw.  Procedure                               Abnormality         Status                     ---------                               -----------         ------                     Green Top (Gel)[[id number]]                                  Final result               Lavender Top[[id number]]                                     Final result               Gold Top - SST[[id number]]                                   Final result               Gray Top[[id number]]                                         Final result               Light Blue Top[[id number]]                                   Final result                 Please view results for these tests on the individual orders.   URINALYSIS, MICROSCOPIC ONLY   POCT CREATININE   CBC AND DIFFERENTIAL    Narrative:     The following orders were created for panel order CBC & Differential.  Procedure                               Abnormality         Status                     ---------                               -----------         ------                     CBC Auto Differential[[id number]]        Abnormal            Final result                 Please view results for these tests on the individual orders.   GREEN TOP   LAVENDER TOP   GOLD TOP - SST   GRAY TOP   LIGHT BLUE TOP       Meds Given in ED:   Medications   Sodium Chloride (PF) 0.9 % 10 mL (has no administration in time range)    famotidine (PEPCID) injection 20 mg (20 mg Intravenous Not Given 12/27/24 1750)   diphenhydrAMINE (BENADRYL) injection 12.5 mg (12.5 mg Intravenous Not Given 12/27/24 1750)   sodium chloride 0.9 % bolus 1,000 mL (0 mL Intravenous Stopped 12/27/24 1959)   HYDROcodone-acetaminophen (NORCO) 5-325 MG per tablet 1 tablet (1 tablet Oral Given 12/27/24 1638)   ondansetron ODT (ZOFRAN-ODT) disintegrating tablet 4 mg (4 mg Oral Given 12/27/24 1638)   iopamidol (ISOVUE-370) 76 % injection 85 mL (85 mL Intravenous Given 12/27/24 1822)           Last NIH score:                                                          Dysphagia screening results:  Patient Factors Component (Dysphagia:Stroke or Rule-out)  Best Eye Response: 4-->(E4) spontaneous (12/27/24 1736)  Best Motor Response: 6-->(M6) obeys commands (12/27/24 1736)  Best Verbal Response: 5-->(V5) oriented (12/27/24 1736)  [place] Coma Scale Score: 15 (12/27/24 1736)     [place] Coma Scale:  No data recorded     CIWA:        Restraint Type:            Isolation Status:  No active isolations

## 2024-12-28 NOTE — H&P
[facility] Medicine Services  HISTORY AND PHYSICAL    Patient Name: Oralia Woodruff  : 1945  MRN: [id number]  Primary Care Physician: Yanni Olsen MD  Date of admission: 2024      Subjective   Subjective     Chief Complaint:  Debility, decreased p.o. intake, weight loss, right lower quadrant abdominal pain, right leg pain.    HPI:  Oralia Woodruff is a 79 y.o. female with past medical history of hypertension.  Patient hypertension reportedly had resolved with her weight loss.  Presented to [facility] ED on 2024 with concern for right lower quadrant abdominal pain rating to her back and her right leg.  Denied numbness or farhan weakness in the right lower extremity.  In general, was brought to the ED for evaluation by her daughter due to concern for general decline initially over the last few years, and more progressive over the last several months.  Decreased ambulation, decreased p.o. intake, has lost about 60 pounds.  She lives 2 hours from the hospital, and daughter currently lives in [place] and was visiting for [place].    ED summary: Afebrile, vital signs stable on room air.  EKG normal sinus rhythm.  High sensitive troponin 16, 17, ACS not suspected.  BUN 39, creatinine 1.6, EGFR 32, alk phos 440.  Lipase 86.  Lactate not elevated.  No leukocytosis.  Hemoglobin 11.  No pattern of infection on urinalysis.  CT ab/pelvis 1.5 cm X1 0.1 cm fluid attenuating cystic lesion of the pancreatic tail consistent with a cystic pancreatic neoplasm, colonic diverticulosis without diverticulitis.  CT angio chest no PE, no other acute finding in the chest.      Personal History     Past Medical History:   Diagnosis Date    Hypertension            Past Surgical History:   Procedure Laterality Date    CHOLECYSTECTOMY      EYE SURGERY      HYSTERECTOMY         Family History: family history is not on file.     Social History:  reports that she has never smoked. She has never used  smokeless tobacco. She reports that she does not drink alcohol and does not use drugs.  Social History     Social History Narrative    Not on file       Medications:  Available home medication information reviewed.  losartan and multivitamin with minerals    No Active Allergies    Objective   Objective     Vital Signs:   Temp:  [98.3 °F (36.8 °C)] 98.3 °F (36.8 °C)  Heart Rate:  [55-73] 55  Resp:  [16] 16  BP: (139-157)/(63-87) 149/78       Physical Exam  Constitutional:       General: She is not in acute distress.     Appearance: She is ill-appearing. She is not toxic-appearing.   HENT:      Mouth/Throat:      Mouth: Mucous membranes are moist.   Eyes:      Extraocular Movements: Extraocular movements intact.   Cardiovascular:      Rate and Rhythm: Normal rate and regular rhythm.      Pulses: Normal pulses.      Heart sounds: Normal heart sounds.   Pulmonary:      Effort: Pulmonary effort is normal.      Breath sounds: Normal breath sounds.   Abdominal:      General: There is no distension.      Palpations: Abdomen is soft.      Tenderness: There is abdominal tenderness.      Comments: Mild tenderness right lower quadrant   Musculoskeletal:      Right lower leg: Edema present.      Left lower leg: Edema present.      Comments: Trace pitting edema bilateral lower extremities   Skin:     General: Skin is warm.   Neurological:      General: No focal deficit present.      Mental Status: She is alert and oriented to person, place, and time.   Psychiatric:         Mood and Affect: Mood normal.         Thought Content: Thought content normal.            Result Review:  I have personally reviewed the results from the time of this admission to 12/27/2024 21:07 EST and agree with these findings:  [x]  Laboratory list / accordion  []  Microbiology  [x]  Radiology  [x]  EKG/Telemetry   []  Cardiology/Vascular   []  Pathology  [x]  Old records        LAB RESULTS:      Lab 12/27/24  1517   WBC 6.27   HEMOGLOBIN 11.9*    HEMATOCRIT 37.5   PLATELETS 207   NEUTROS ABS 4.45   IMMATURE GRANS (ABS) 0.01   LYMPHS ABS 1.37   MONOS ABS 0.38   EOS ABS 0.03   MCV 94.7   LACTATE 0.9         Lab 12/27/24  1642 12/27/24  1638   SODIUM  --  135*   POTASSIUM  --  4.7   CHLORIDE  --  105   CO2  --  20.0*   ANION GAP  --  10.0   BUN  --  39*   CREATININE 1.70* 1.60*   EGFR  --  32.5*   GLUCOSE  --  94   CALCIUM  --  12.6*         Lab 12/27/24  1638   TOTAL PROTEIN 6.7   ALBUMIN 3.9   GLOBULIN 2.8   ALT (SGPT) 10   AST (SGOT) 20   BILIRUBIN 0.3   ALK PHOS 440*   LIPASE 86*         Lab 12/27/24  1839 12/27/24  1638   HSTROP T 17* 16*                 UA          12/27/2024    15:18   Urinalysis   Squamous Epithelial Cells, UA 0-2    Specific Gravity, UA 1.013    Ketones, UA Negative    Blood, UA Negative    Leukocytes, UA Negative    Nitrite, UA Negative    RBC, UA 0-2    WBC, UA 0-2    Bacteria, UA None Seen        Microbiology Results (last 10 days)       ** No results found for the last 240 hours. **            CT Abdomen Pelvis With Contrast    Result Date: 12/27/2024  CT ANGIOGRAM CHEST, CT ABDOMEN PELVIS W CONTRAST Date of Exam: 12/27/2024 6:14 PM EST Indication: pe. weight loss.. Comparison: None available. Technique: CTA of the chest and CT of the abdomen and pelvis were performed after the uneventful intravenous administration of 85 cc Isovue-370 IV contrast . Reconstructed coronal and sagittal images were also obtained. In addition, a 3-D volume rendered  image was created for interpretation. Automated exposure control and iterative reconstruction methods were used. Findings: Symmetric thyroid enlargement. No consolidation. No pneumothorax or pleural effusion. The heart and pericardium are normal. No evidence of aortic dissection. Atheromatous disease of the aortic arch. No pulmonary embolus. No mediastinal, perihilar, or axillary adenopathy. Moderate chronic anterior wedging of T6. Degenerative endplate changes. Osteopenia. Multilevel mild  to space narrowing. Vacuum disc phenomenon. The spinous processes are intact. No rib fractures are seen. Liver: Multiple fluid attenuating hepatic cysts measuring up to 3.3 cm. Spleen:No masses. No perisplenic hematoma. Pancreas: 1.5 cm x 1.1 cm fluid attenuating cystic lesion of the pancreatic tail (consistent with a cystic pancreatic neoplasm. Gallbladder and common bile duct: Previous cholecystectomy. Adrenal glands:No adrenal masses Kidneys and ureters: 0.8 cm left renal cyst. No calculi present within the ureters. Normal caliber ureters. Urinary bladder:No urinary bladder wall thickening. No bladder masses. Small bowel:Normal caliber small bowel. Large bowel: Colonic diverticulosis without evidence of diverticulitis. Appendix: Not seen however there is no evidence of appendicitis GENITOURINARY: Previous hysterectomy Ascites or pneumoperitoneum:None. Adenopathy:None present Osseous structures: The pubic bones are intact. The proximal femurs are intact. Degenerative changes of the hips. Bilateral hip joint effusions without capsular enhancement. The sacrum and sacroiliac joints are normal. Scoliotic curvature of the spine. Grade 2 anterolisthesis of L4 on L5 with severe degenerative changes. No spondylolysis. Other findings: Atheromatous disease of the abdominal aorta and visualized branches.     Impression: 1.No pulmonary embolus. 2.No acute findings within the chest, abdomen or pelvis. 3.1.5 cm x 1.1 cm fluid attenuating cystic lesion of the pancreatic tail consistent with a cystic pancreatic neoplasm. 4.Colonic diverticulosis without evidence of diverticulitis. 5.Additional chronic findings as discussed above. Electronically Signed: Evens Rojas MD  12/27/2024 6:40 PM EST  Workstation ID: [id number]    CT Angiogram Chest    Result Date: 12/27/2024  CT ANGIOGRAM CHEST, CT ABDOMEN PELVIS W CONTRAST Date of Exam: 12/27/2024 6:14 PM EST Indication: pe. weight loss.. Comparison: None available. Technique: CTA of the  chest and CT of the abdomen and pelvis were performed after the uneventful intravenous administration of 85 cc Isovue-370 IV contrast . Reconstructed coronal and sagittal images were also obtained. In addition, a 3-D volume rendered  image was created for interpretation. Automated exposure control and iterative reconstruction methods were used. Findings: Symmetric thyroid enlargement. No consolidation. No pneumothorax or pleural effusion. The heart and pericardium are normal. No evidence of aortic dissection. Atheromatous disease of the aortic arch. No pulmonary embolus. No mediastinal, perihilar, or axillary adenopathy. Moderate chronic anterior wedging of T6. Degenerative endplate changes. Osteopenia. Multilevel mild to space narrowing. Vacuum disc phenomenon. The spinous processes are intact. No rib fractures are seen. Liver: Multiple fluid attenuating hepatic cysts measuring up to 3.3 cm. Spleen:No masses. No perisplenic hematoma. Pancreas: 1.5 cm x 1.1 cm fluid attenuating cystic lesion of the pancreatic tail (consistent with a cystic pancreatic neoplasm. Gallbladder and common bile duct: Previous cholecystectomy. Adrenal glands:No adrenal masses Kidneys and ureters: 0.8 cm left renal cyst. No calculi present within the ureters. Normal caliber ureters. Urinary bladder:No urinary bladder wall thickening. No bladder masses. Small bowel:Normal caliber small bowel. Large bowel: Colonic diverticulosis without evidence of diverticulitis. Appendix: Not seen however there is no evidence of appendicitis GENITOURINARY: Previous hysterectomy Ascites or pneumoperitoneum:None. Adenopathy:None present Osseous structures: The pubic bones are intact. The proximal femurs are intact. Degenerative changes of the hips. Bilateral hip joint effusions without capsular enhancement. The sacrum and sacroiliac joints are normal. Scoliotic curvature of the spine. Grade 2 anterolisthesis of L4 on L5 with severe degenerative changes. No  spondylolysis. Other findings: Atheromatous disease of the abdominal aorta and visualized branches.     Impression: 1.No pulmonary embolus. 2.No acute findings within the chest, abdomen or pelvis. 3.1.5 cm x 1.1 cm fluid attenuating cystic lesion of the pancreatic tail consistent with a cystic pancreatic neoplasm. 4.Colonic diverticulosis without evidence of diverticulitis. 5.Additional chronic findings as discussed above. Electronically Signed: Evens Rojas MD  12/27/2024 6:40 PM EST  Workstation ID: [id number]         Assessment & Plan   Assessment & Plan       Pancreatic mass    Observation general floor admission 12/27/2024 with pancreatic mass suspected neoplasm, failure to thrive, elevated creatinine.    Pancreatic cystic lesion  Oncology consultation, recommendations appreciated.  Check CA 19-9.  Elevated creatinine  IVF.  Monitor response overnight.  Echocardiogram.   No hydronephrosis on CT, may be multifactorial.  Daughter stated that she believes her mother may be dehydrated, significantly reduced p.o. intake.  Daughter at bedside, supportive, she is a nurse practitioner.  Debility  PT/OT.  Per patient and daughter, she likely would be able to take care of herself at home at this point, but daughter is concerned that if her decline progresses any further that would have to be revisited.      VTE Prophylaxis: SCDs  No VTE prophylaxis order currently exists.          CODE STATUS:  full code  There are no questions and answers to display.       Expected Discharge   Expected discharge date/ time has not been documented.     Brian Joseph Kerley, DO  12/27/24

## 2024-12-28 NOTE — CONSULTS
Subjective     CHIEF COMPLAINT: Unexplained weight loss with back pain    HISTORY OF PRESENT ILLNESS:  The patient is a 79 y.o. female, referred by Lilly Awad,* for unexplained weight loss.  The patient had full body scans that revealed 1.5 cm peripheral pancreatic lesion but no other noted abnormalities.  Blood work revealed anemia acute kidney injury and high calcium.  I was consulted to further assist in her care.  When I saw the patient today she is laying comfortably in bed.  She is complaining of back pain.  She has lost 60 pounds over the last 2 years she has been losing almost 5 pounds every week over the last few months.  She has never been diagnosed with cancer before.      REVIEW OF SYSTEMS:  A 14 point review of systems was performed and is negative except as noted above.    Past Medical History:   Diagnosis Date    Hypertension        No current facility-administered medications on file prior to encounter.     Current Outpatient Medications on File Prior to Encounter   Medication Sig Dispense Refill    losartan (COZAAR) 25 MG tablet Take 2 tablets by mouth 2 (Two) Times a Day. 60 tablet 0    MULTIPLE VITAMINS-MINERALS PO Take  by mouth Daily.         No Active Allergies    Past Surgical History:   Procedure Laterality Date    CHOLECYSTECTOMY      EYE SURGERY      HYSTERECTOMY         OB History   No obstetric history on file.       Social History     Socioeconomic History    Marital status:    Tobacco Use    Smoking status: Never    Smokeless tobacco: Never   Vaping Use    Vaping status: Never Used   Substance and Sexual Activity    Alcohol use: No    Drug use: No    Sexual activity: Defer       History reviewed. No pertinent family history.    Objective     Vitals:    12/28/24 0035 12/28/24 0404 12/28/24 0735 12/28/24 0900   BP: 145/74 157/77 137/70 149/70   BP Location: Left arm Left arm Left arm Left arm   Patient Position: Lying Lying Lying Lying   Pulse: 51 61 64 59   Resp: 18  18 18 16   Temp: 98.1 °F (36.7 °C) 97.5 °F (36.4 °C) 97.7 °F (36.5 °C) 97.9 °F (36.6 °C)   TempSrc: Oral Oral Oral Oral   SpO2: 95% 96% 98% 97%   Weight:       Height:                            ECOG Performance Status: 1 - Symptomatic but completely ambulatory  General: well appearing female in no acute distress  Neuro/Psych: A&O x 3, gait steady, appropriate affect, strength 5/5 in all muscle groups  HEENT: sclerae anicteric, oropharynx clear  Lymphatics: no cervical, supraclavicular, or axillary adenopathy  Cardiovascular: regular rate and rhythm, no murmurs  Lungs: clear to auscultation bilaterally  Abdomen: soft, nontender, nondistended.  No palpable organomegaly  Extremities: no lower extremity edema  Skin: no rashes, lesions, bruising, or petechiae      Admission on 12/27/2024   Component Date Value Ref Range Status    Glucose 12/27/2024 94  65 - 99 mg/dL Final    BUN 12/27/2024 39 (H)  8 - 23 mg/dL Final    Creatinine 12/27/2024 1.60 (H)  0.57 - 1.00 mg/dL Final    Sodium 12/27/2024 135 (L)  136 - 145 mmol/L Final    Potassium 12/27/2024 4.7  3.5 - 5.2 mmol/L Final    Slight hemolysis detected by analyzer. Result may be falsely elevated.    Chloride 12/27/2024 105  98 - 107 mmol/L Final    CO2 12/27/2024 20.0 (L)  22.0 - 29.0 mmol/L Final    Calcium 12/27/2024 12.6 (H)  8.6 - 10.5 mg/dL Final    Total Protein 12/27/2024 6.7  6.0 - 8.5 g/dL Final    Albumin 12/27/2024 3.9  3.5 - 5.2 g/dL Final    ALT (SGPT) 12/27/2024 10  1 - 33 U/L Final    AST (SGOT) 12/27/2024 20  1 - 32 U/L Final    Alkaline Phosphatase 12/27/2024 440 (H)  39 - 117 U/L Final    Total Bilirubin 12/27/2024 0.3  0.0 - 1.2 mg/dL Final    Globulin 12/27/2024 2.8  gm/dL Final    Calculated Result    A/G Ratio 12/27/2024 1.4  g/dL Final    BUN/Creatinine Ratio 12/27/2024 24.4  7.0 - 25.0 Final    Anion Gap 12/27/2024 10.0  5.0 - 15.0 mmol/L Final    eGFR 12/27/2024 32.5 (L)  >60.0 mL/min/1.73 Final    Lipase 12/27/2024 86 (H)  13 - 60 U/L Final     Color, UA 12/27/2024 Yellow  Yellow, Straw Final    Appearance, UA 12/27/2024 Clear  Clear Final    pH, UA 12/27/2024 6.0  5.0 - 8.0 Final    Specific Gravity, UA 12/27/2024 1.013  1.001 - 1.030 Final    Glucose, UA 12/27/2024 100 mg/dL (Trace) (A)  Negative Final    Ketones, UA 12/27/2024 Negative  Negative Final    Bilirubin, UA 12/27/2024 Negative  Negative Final    Blood, UA 12/27/2024 Negative  Negative Final    Protein, UA 12/27/2024 30 mg/dL (1+) (A)  Negative Final    Leuk Esterase, UA 12/27/2024 Negative  Negative Final    Nitrite, UA 12/27/2024 Negative  Negative Final    Urobilinogen, UA 12/27/2024 0.2 E.U./dL  0.2 - 1.0 E.U./dL Final    Lactate 12/27/2024 0.9  0.5 - 2.0 mmol/L Final    Falsely depressed results may occur on samples drawn from patients receiving N-Acetylcysteine (NAC) or Metamizole.    Extra Tube 12/27/2024 Hold for add-ons.   Final    Auto resulted.    Extra Tube 12/27/2024 hold for add-on   Final    Auto resulted    Extra Tube 12/27/2024 Hold for add-ons.   Final    Auto resulted.    Extra Tube 12/27/2024 Hold for add-ons.   Final    Auto resulted.    Extra Tube 12/27/2024 Hold for add-ons.   Final    Auto resulted    WBC 12/27/2024 6.27  3.40 - 10.80 10*3/mm3 Final    RBC 12/27/2024 3.96  3.77 - 5.28 10*6/mm3 Final    Hemoglobin 12/27/2024 11.9 (L)  12.0 - 15.9 g/dL Final    Hematocrit 12/27/2024 37.5  34.0 - 46.6 % Final    MCV 12/27/2024 94.7  79.0 - 97.0 fL Final    MCH 12/27/2024 30.1  26.6 - 33.0 pg Final    MCHC 12/27/2024 31.7  31.5 - 35.7 g/dL Final    RDW 12/27/2024 13.5  12.3 - 15.4 % Final    RDW-SD 12/27/2024 47.1  37.0 - 54.0 fl Final    MPV 12/27/2024 10.9  6.0 - 12.0 fL Final    Platelets 12/27/2024 207  140 - 450 10*3/mm3 Final    Neutrophil % 12/27/2024 70.8  42.7 - 76.0 % Final    Lymphocyte % 12/27/2024 21.9  19.6 - 45.3 % Final    Monocyte % 12/27/2024 6.1  5.0 - 12.0 % Final    Eosinophil % 12/27/2024 0.5  0.3 - 6.2 % Final    Basophil % 12/27/2024 0.5  0.0 -  1.5 % Final    Immature Grans % 12/27/2024 0.2  0.0 - 0.5 % Final    Neutrophils, Absolute 12/27/2024 4.45  1.70 - 7.00 10*3/mm3 Final    Lymphocytes, Absolute 12/27/2024 1.37  0.70 - 3.10 10*3/mm3 Final    Monocytes, Absolute 12/27/2024 0.38  0.10 - 0.90 10*3/mm3 Final    Eosinophils, Absolute 12/27/2024 0.03  0.00 - 0.40 10*3/mm3 Final    Basophils, Absolute 12/27/2024 0.03  0.00 - 0.20 10*3/mm3 Final    Immature Grans, Absolute 12/27/2024 0.01  0.00 - 0.05 10*3/mm3 Final    nRBC 12/27/2024 0.0  0.0 - 0.2 /100 WBC Final    HS Troponin T 12/27/2024 16 (H)  <14 ng/L Final    RBC, UA 12/27/2024 0-2  None Seen, 0-2 /HPF Final    WBC, UA 12/27/2024 0-2  None Seen, 0-2 /HPF Final    Bacteria, UA 12/27/2024 None Seen  None Seen, Trace /HPF Final    Squamous Epithelial Cells, UA 12/27/2024 0-2  None Seen, 0-2 /HPF Final    Hyaline Casts, UA 12/27/2024 0-6  0 - 6 /LPF Final    Granular Casts, UA 12/27/2024 3-6  None Seen /LPF Final    Methodology 12/27/2024 Manual Light Microscopy   Final    HS Troponin T 12/27/2024 17 (H)  <14 ng/L Final    Troponin T Numeric Delta 12/27/2024 1  ng/L Final    Troponin T % Delta 12/27/2024 6  Abnormal if >/= 20% % Final    Creatinine 12/27/2024 1.70 (H)  0.60 - 1.30 mg/dL Final    Serial Number: [id number]:  [id number]    QT Interval 12/27/2024 410  ms Preliminary    QTC Interval 12/27/2024 416  ms Preliminary    Glucose 12/28/2024 87  65 - 99 mg/dL Final    BUN 12/28/2024 36 (H)  8 - 23 mg/dL Final    Creatinine 12/28/2024 1.51 (H)  0.57 - 1.00 mg/dL Final    Sodium 12/28/2024 138  136 - 145 mmol/L Final    Potassium 12/28/2024 4.1  3.5 - 5.2 mmol/L Final    Chloride 12/28/2024 108 (H)  98 - 107 mmol/L Final    CO2 12/28/2024 19.0 (L)  22.0 - 29.0 mmol/L Final    Calcium 12/28/2024 12.1 (H)  8.6 - 10.5 mg/dL Final    BUN/Creatinine Ratio 12/28/2024 23.8  7.0 - 25.0 Final    Anion Gap 12/28/2024 11.0  5.0 - 15.0 mmol/L Final    eGFR 12/28/2024 34.8 (L)  >60.0 mL/min/1.73 Final    WBC  12/28/2024 4.97  3.40 - 10.80 10*3/mm3 Final    RBC 12/28/2024 3.40 (L)  3.77 - 5.28 10*6/mm3 Final    Hemoglobin 12/28/2024 10.2 (L)  12.0 - 15.9 g/dL Final    Hematocrit 12/28/2024 32.7 (L)  34.0 - 46.6 % Final    MCV 12/28/2024 96.2  79.0 - 97.0 fL Final    MCH 12/28/2024 30.0  26.6 - 33.0 pg Final    MCHC 12/28/2024 31.2 (L)  31.5 - 35.7 g/dL Final    RDW 12/28/2024 13.5  12.3 - 15.4 % Final    RDW-SD 12/28/2024 47.8  37.0 - 54.0 fl Final    MPV 12/28/2024 11.5  6.0 - 12.0 fL Final    Platelets 12/28/2024 194  140 - 450 10*3/mm3 Final    Neutrophil % 12/28/2024 54.1  42.7 - 76.0 % Final    Lymphocyte % 12/28/2024 33.8  19.6 - 45.3 % Final    Monocyte % 12/28/2024 9.3  5.0 - 12.0 % Final    Eosinophil % 12/28/2024 2.0  0.3 - 6.2 % Final    Basophil % 12/28/2024 0.6  0.0 - 1.5 % Final    Immature Grans % 12/28/2024 0.2  0.0 - 0.5 % Final    Neutrophils, Absolute 12/28/2024 2.69  1.70 - 7.00 10*3/mm3 Final    Lymphocytes, Absolute 12/28/2024 1.68  0.70 - 3.10 10*3/mm3 Final    Monocytes, Absolute 12/28/2024 0.46  0.10 - 0.90 10*3/mm3 Final    Eosinophils, Absolute 12/28/2024 0.10  0.00 - 0.40 10*3/mm3 Final    Basophils, Absolute 12/28/2024 0.03  0.00 - 0.20 10*3/mm3 Final    Immature Grans, Absolute 12/28/2024 0.01  0.00 - 0.05 10*3/mm3 Final    nRBC 12/28/2024 0.0  0.0 - 0.2 /100 WBC Final    EF(MOD-bp) 12/28/2024 59.2  % Final    LVIDd 12/28/2024 4.4  cm Final    LVIDs 12/28/2024 2.00  cm Final    IVSd 12/28/2024 0.80  cm Final    LVPWd 12/28/2024 1.00  cm Final    FS 12/28/2024 54.5  % Final    IVS/LVPW 12/28/2024 0.80  cm Final    ESV(cubed) 12/28/2024 8.0  ml Final    LV Sys Vol (BSA corrected) 12/28/2024 25.8  cm2 Final    EDV(cubed) 12/28/2024 85.2  ml Final    LV Hines Vol (BSA corrected) 12/28/2024 64.7  cm2 Final    LV mass(C)d 12/28/2024 128.0  grams Final    LVOT area 12/28/2024 2.8  cm2 Final    LVOT diam 12/28/2024 1.90  cm Final    EDV(MOD-sp2) 12/28/2024 57.7  ml Final    EDV(MOD-sp4) 12/28/2024  98.1  ml Final    ESV(MOD-sp2) 12/28/2024 21.8  ml Final    ESV(MOD-sp4) 12/28/2024 39.2  ml Final    SV(MOD-sp2) 12/28/2024 35.9  ml Final    SV(MOD-sp4) 12/28/2024 58.9  ml Final    SVi(MOD-SP2) 12/28/2024 23.7  ml/m2 Final    SVi(MOD-SP4) 12/28/2024 38.8  ml/m2 Final    SVi (LVOT) 12/28/2024 47.2  ml/m2 Final    EF(MOD-sp2) 12/28/2024 62.2  % Final    EF(MOD-sp4) 12/28/2024 60.0  % Final    MV E max jeff 12/28/2024 92.0  cm/sec Final    MV A max jeff 12/28/2024 127.0  cm/sec Final    MV dec time 12/28/2024 0.20  sec Final    MV E/A 12/28/2024 0.72   Final    LA ESV Index (BP) 12/28/2024 39.0  ml/m2 Final    Med Peak E' Jeff 12/28/2024 9.2  cm/sec Final    Lat Peak E' Jeff 12/28/2024 13.6  cm/sec Final    Avg E/e' ratio 12/28/2024 8.07   Final    SV(LVOT) 12/28/2024 71.6  ml Final    RV Base 12/28/2024 2.6  cm Final    RV Mid 12/28/2024 1.60  cm Final    RV Length 12/28/2024 7.4  cm Final    TAPSE (>1.6) 12/28/2024 1.74  cm Final    RV S' 12/28/2024 14.0  cm/sec Final    LA dimension (2D)  12/28/2024 3.3  cm Final    LV V1 max 12/28/2024 123.0  cm/sec Final    LV V1 max PG 12/28/2024 6.1  mmHg Final    LV V1 mean PG 12/28/2024 2.00  mmHg Final    LV V1 VTI 12/28/2024 25.3  cm Final    Ao pk jeff 12/28/2024 166.0  cm/sec Final    Ao max PG 12/28/2024 11.0  mmHg Final    Ao mean PG 12/28/2024 4.5  mmHg Final    Ao V2 VTI 12/28/2024 30.7  cm Final    PADMINI(I,D) 12/28/2024 2.34  cm2 Final    AI P1/2t 12/28/2024 729.7  msec Final    MV max PG 12/28/2024 5.1  mmHg Final    MV mean PG 12/28/2024 2.00  mmHg Final    MV V2 VTI 12/28/2024 31.7  cm Final    MV P1/2t 12/28/2024 66.1  msec Final    MVA(P1/2t) 12/28/2024 3.3  cm2 Final    MVA(VTI) 12/28/2024 2.26  cm2 Final    MV dec slope 12/28/2024 423.0  cm/sec2 Final    MR max jeff 12/28/2024 501.0  cm/sec Final    MR max PG 12/28/2024 100.4  mmHg Final    MR mean jeff 12/28/2024 409.0  cm/sec Final    MR mean PG 12/28/2024 74.0  mmHg Final    MR VTI 12/28/2024 191.0  cm Final    PA  V2 max 12/28/2024 111.5  cm/sec Final    PA acc time 12/28/2024 0.15  sec Final    PI end-d marco antonio 12/28/2024 99.3  cm/sec Final    Ao root diam 12/28/2024 3.1  cm Final    IVRT 12/28/2024 92.0  ms Final    Ascending aorta 12/28/2024 3.6  cm Final    CA 19-9 12/27/2024 41.0 (H)  <=35.0 U/mL Final        No results found.    ASSESSMENT 79 years old female with unexplained weight loss and hypercalcemia    PLAN  1.  Unexplained weight loss:  A.  I reviewed the patient's chart including admission note blood results and imaging report.  B.  The only abnormality noted on her scans is peripheral pancreatic cyst which I am not sure if it is malignant or not.  C.  I will check tumor markers including CA 19-9, CEA 27-29, CEA,  and CA 15-3.    2.  Back pain:  A.  I will order a bone scan    3.  Hypercalcemia:  A.  I will check PTH, PTH related peptide SPEP and serum free light chains.  B.  Continue IV fluid.  C.  I will add 1 dose of IV Zometa.    3.  Acute kidney injury:  A.  Creatinine had improved to 1.5 from 1.7 on admission.  B.  Continue IV fluid.    Emory Melo MD    12/28/2024

## 2024-12-28 NOTE — PROGRESS NOTES
[facility] Medicine Services  PROGRESS NOTE    Patient Name: Oralia Woodruff  : 1945  MRN: [id number]    Date of Admission: 2024  Primary Care Physician: Yanni Olsen MD    Subjective   Subjective     CC:  Weight loss    HPI:  Complaining of right-sided abdominal pain with radiation down into her right groin.      Objective   Objective     Vital Signs:   Temp:  [97.5 °F (36.4 °C)-98.1 °F (36.7 °C)] 97.9 °F (36.6 °C)  Heart Rate:  [51-66] 59  Resp:  [16-18] 16  BP: (137-175)/(63-91) 149/70     Physical Exam:  Constitutional: No acute distress, awake, alert  HENT: NCAT, mucous membranes moist  Respiratory: Respiratory effort normal   Cardiovascular: RRR, no murmurs, rubs, or gallops  Gastrointestinal: Positive bowel sounds, soft, nontender, nondistended  Musculoskeletal: trace LE edema, R thigh edema  Psychiatric: Appropriate affect, cooperative  Neurologic: Oriented x 3, speech clear  Skin: No rashes      Results Reviewed:  LAB RESULTS:      Lab 24  0411 24  1839 24  1638 24  1517   WBC 4.97  --   --  6.27   HEMOGLOBIN 10.2*  --   --  11.9*   HEMATOCRIT 32.7*  --   --  37.5   PLATELETS 194  --   --  207   NEUTROS ABS 2.69  --   --  4.45   IMMATURE GRANS (ABS) 0.01  --   --  0.01   LYMPHS ABS 1.68  --   --  1.37   MONOS ABS 0.46  --   --  0.38   EOS ABS 0.10  --   --  0.03   MCV 96.2  --   --  94.7   LACTATE  --   --   --  0.9   HSTROP T  --  17* 16*  --          Lab 24  0411 24  1642 24  1638   SODIUM 138  --  135*   POTASSIUM 4.1  --  4.7   CHLORIDE 108*  --  105   CO2 19.0*  --  20.0*   ANION GAP 11.0  --  10.0   BUN 36*  --  39*   CREATININE 1.51* 1.70* 1.60*   EGFR 34.8*  --  32.5*   GLUCOSE 87  --  94   CALCIUM 12.1*  --  12.6*         Lab 24  1638   TOTAL PROTEIN 6.7   ALBUMIN 3.9   GLOBULIN 2.8   ALT (SGPT) 10   AST (SGOT) 20   BILIRUBIN 0.3   ALK PHOS 440*   LIPASE 86*         Lab 24  1839 24  1630    HSTROP T 17* 16*                 Brief Urine Lab Results  (Last result in the past 365 days)        Color   Clarity   Blood   Leuk Est   Nitrite   Protein   CREAT   Urine HCG        12/27/24 1518 Yellow   Clear   Negative   Negative   Negative   30 mg/dL (1+)                   Microbiology Results Abnormal       None            CT Abdomen Pelvis With Contrast    Result Date: 12/27/2024  CT ANGIOGRAM CHEST, CT ABDOMEN PELVIS W CONTRAST Date of Exam: 12/27/2024 6:14 PM EST Indication: pe. weight loss.. Comparison: None available. Technique: CTA of the chest and CT of the abdomen and pelvis were performed after the uneventful intravenous administration of 85 cc Isovue-370 IV contrast . Reconstructed coronal and sagittal images were also obtained. In addition, a 3-D volume rendered  image was created for interpretation. Automated exposure control and iterative reconstruction methods were used. Findings: Symmetric thyroid enlargement. No consolidation. No pneumothorax or pleural effusion. The heart and pericardium are normal. No evidence of aortic dissection. Atheromatous disease of the aortic arch. No pulmonary embolus. No mediastinal, perihilar, or axillary adenopathy. Moderate chronic anterior wedging of T6. Degenerative endplate changes. Osteopenia. Multilevel mild to space narrowing. Vacuum disc phenomenon. The spinous processes are intact. No rib fractures are seen. Liver: Multiple fluid attenuating hepatic cysts measuring up to 3.3 cm. Spleen:No masses. No perisplenic hematoma. Pancreas: 1.5 cm x 1.1 cm fluid attenuating cystic lesion of the pancreatic tail (consistent with a cystic pancreatic neoplasm. Gallbladder and common bile duct: Previous cholecystectomy. Adrenal glands:No adrenal masses Kidneys and ureters: 0.8 cm left renal cyst. No calculi present within the ureters. Normal caliber ureters. Urinary bladder:No urinary bladder wall thickening. No bladder masses. Small bowel:Normal caliber small bowel.  Large bowel: Colonic diverticulosis without evidence of diverticulitis. Appendix: Not seen however there is no evidence of appendicitis GENITOURINARY: Previous hysterectomy Ascites or pneumoperitoneum:None. Adenopathy:None present Osseous structures: The pubic bones are intact. The proximal femurs are intact. Degenerative changes of the hips. Bilateral hip joint effusions without capsular enhancement. The sacrum and sacroiliac joints are normal. Scoliotic curvature of the spine. Grade 2 anterolisthesis of L4 on L5 with severe degenerative changes. No spondylolysis. Other findings: Atheromatous disease of the abdominal aorta and visualized branches.     Impression: 1.No pulmonary embolus. 2.No acute findings within the chest, abdomen or pelvis. 3.1.5 cm x 1.1 cm fluid attenuating cystic lesion of the pancreatic tail consistent with a cystic pancreatic neoplasm. 4.Colonic diverticulosis without evidence of diverticulitis. 5.Additional chronic findings as discussed above. Electronically Signed: Evens Rojas MD  12/27/2024 6:40 PM EST  Workstation ID: [id number]    CT Angiogram Chest    Result Date: 12/27/2024  CT ANGIOGRAM CHEST, CT ABDOMEN PELVIS W CONTRAST Date of Exam: 12/27/2024 6:14 PM EST Indication: pe. weight loss.. Comparison: None available. Technique: CTA of the chest and CT of the abdomen and pelvis were performed after the uneventful intravenous administration of 85 cc Isovue-370 IV contrast . Reconstructed coronal and sagittal images were also obtained. In addition, a 3-D volume rendered  image was created for interpretation. Automated exposure control and iterative reconstruction methods were used. Findings: Symmetric thyroid enlargement. No consolidation. No pneumothorax or pleural effusion. The heart and pericardium are normal. No evidence of aortic dissection. Atheromatous disease of the aortic arch. No pulmonary embolus. No mediastinal, perihilar, or axillary adenopathy. Moderate chronic anterior  wedging of T6. Degenerative endplate changes. Osteopenia. Multilevel mild to space narrowing. Vacuum disc phenomenon. The spinous processes are intact. No rib fractures are seen. Liver: Multiple fluid attenuating hepatic cysts measuring up to 3.3 cm. Spleen:No masses. No perisplenic hematoma. Pancreas: 1.5 cm x 1.1 cm fluid attenuating cystic lesion of the pancreatic tail (consistent with a cystic pancreatic neoplasm. Gallbladder and common bile duct: Previous cholecystectomy. Adrenal glands:No adrenal masses Kidneys and ureters: 0.8 cm left renal cyst. No calculi present within the ureters. Normal caliber ureters. Urinary bladder:No urinary bladder wall thickening. No bladder masses. Small bowel:Normal caliber small bowel. Large bowel: Colonic diverticulosis without evidence of diverticulitis. Appendix: Not seen however there is no evidence of appendicitis GENITOURINARY: Previous hysterectomy Ascites or pneumoperitoneum:None. Adenopathy:None present Osseous structures: The pubic bones are intact. The proximal femurs are intact. Degenerative changes of the hips. Bilateral hip joint effusions without capsular enhancement. The sacrum and sacroiliac joints are normal. Scoliotic curvature of the spine. Grade 2 anterolisthesis of L4 on L5 with severe degenerative changes. No spondylolysis. Other findings: Atheromatous disease of the abdominal aorta and visualized branches.     Impression: 1.No pulmonary embolus. 2.No acute findings within the chest, abdomen or pelvis. 3.1.5 cm x 1.1 cm fluid attenuating cystic lesion of the pancreatic tail consistent with a cystic pancreatic neoplasm. 4.Colonic diverticulosis without evidence of diverticulitis. 5.Additional chronic findings as discussed above. Electronically Signed: Evens Rojas MD  12/27/2024 6:40 PM EST  Workstation ID: [id number]     Results for orders placed during the hospital encounter of 12/27/24    Adult Transthoracic Echo Complete W/ Cont if Necessary Per  Protocol    Interpretation Summary    Left ventricular systolic function is normal. Calculated left ventricular EF = 59.2% Left ventricular ejection fraction appears to be 56 - 60%.    Left ventricular diastolic function was normal.      Current medications:  Scheduled Meds:sodium chloride, 10 mL, Intravenous, Q12H      Continuous Infusions:   PRN Meds:.  acetaminophen **OR** acetaminophen **OR** acetaminophen    Calcium Replacement - Follow Nurse / BPA Driven Protocol    Magnesium Standard Dose Replacement - Follow Nurse / BPA Driven Protocol    nitroglycerin    ondansetron    Phosphorus Replacement - Follow Nurse / BPA Driven Protocol    Potassium Replacement - Follow Nurse / BPA Driven Protocol    Sodium Chloride (PF)    sodium chloride    sodium chloride    Assessment & Plan   Assessment & Plan     Active Hospital Problems    Diagnosis  POA    **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        Brief Hospital Course to date:  Oralia Woodruff is a 79 y.o. female with history of hypertension admitted for failure to thrive and significant weight loss.  She was brought in by her daughter who is visiting over the CT abdomen/pelvis concerning for a cystic lesion of the pancreas neoplasm.    Abdominal pain  Weight loss  Pancreatic cystic lesion  -Concern for malignancy, oncology consulted  -CA 19-9 pending    Mild hypercalcemia  -Improved with IVF  -Check a.m. PTH  -Check a.m. ionized calcium    RLE pain  -Check duplex right lower extremity to rule out DVT    Renal insufficiency  Unsure of baseline CKD    Anemia, normocytic  -Check a.m. iron profile, B12, folate    Debility, weakness  -PT/OT    Expected Discharge Location and Transportation: ?  Expected Discharge   Expected Discharge Date: 12/31/2024; Expected Discharge Time:      VTE Prophylaxis:  Mechanical VTE prophylaxis orders are present.         AM-PAC 6 Clicks Score (PT): 17 (12/27/24 2123)    CODE STATUS:    Code Status and Medical Interventions: CPR (Attempt to Resuscitate); Full Support   Ordered at: 12/27/24 2231     Code Status (Patient has no pulse and is not breathing):    CPR (Attempt to Resuscitate)     Medical Interventions (Patient has pulse or is breathing):    Full Support       Lilly Awad, DO  12/28/24

## 2024-12-29 LAB
ALBUMIN SERPL-MCNC: 3.4 G/DL (ref 3.5–5.2)
ALBUMIN/GLOB SERPL: 1.6 G/DL
ALP SERPL-CCNC: 388 U/L (ref 39–117)
ALT SERPL W P-5'-P-CCNC: 9 U/L (ref 1–33)
ANION GAP SERPL CALCULATED.3IONS-SCNC: 10 MMOL/L (ref 5–15)
AST SERPL-CCNC: 18 U/L (ref 1–32)
BASOPHILS # BLD AUTO: 0.02 10*3/MM3 (ref 0–0.2)
BASOPHILS NFR BLD AUTO: 0.5 % (ref 0–1.5)
BILIRUB SERPL-MCNC: <0.2 MG/DL (ref 0–1.2)
BUN SERPL-MCNC: 30 MG/DL (ref 8–23)
BUN/CREAT SERPL: 17.6 (ref 7–25)
CA-I SERPL ISE-MCNC: 1.49 MMOL/L (ref 1.15–1.3)
CALCIUM SPEC-SCNC: 11.2 MG/DL (ref 8.6–10.5)
CANCER AG125 SERPL QL: 7.9 U/ML (ref 0–38.1)
CANCER AG15-3 SERPL-ACNC: 24.6 U/ML
CEA SERPL-MCNC: 1.13 NG/ML
CHLORIDE SERPL-SCNC: 110 MMOL/L (ref 98–107)
CO2 SERPL-SCNC: 20 MMOL/L (ref 22–29)
CREAT SERPL-MCNC: 1.7 MG/DL (ref 0.57–1)
DEPRECATED RDW RBC AUTO: 46.5 FL (ref 37–54)
EGFRCR SERPLBLD CKD-EPI 2021: 30.2 ML/MIN/1.73
EOSINOPHIL # BLD AUTO: 0.09 10*3/MM3 (ref 0–0.4)
EOSINOPHIL NFR BLD AUTO: 2.1 % (ref 0.3–6.2)
ERYTHROCYTE [DISTWIDTH] IN BLOOD BY AUTOMATED COUNT: 13.4 % (ref 12.3–15.4)
FOLATE SERPL-MCNC: 14.5 NG/ML (ref 4.78–24.2)
GLOBULIN UR ELPH-MCNC: 2.1 GM/DL
GLUCOSE SERPL-MCNC: 97 MG/DL (ref 65–99)
HCT VFR BLD AUTO: 31.8 % (ref 34–46.6)
HGB BLD-MCNC: 10.2 G/DL (ref 12–15.9)
IMM GRANULOCYTES # BLD AUTO: 0.01 10*3/MM3 (ref 0–0.05)
IMM GRANULOCYTES NFR BLD AUTO: 0.2 % (ref 0–0.5)
IRON 24H UR-MRATE: 71 MCG/DL (ref 37–145)
IRON SATN MFR SERPL: 29 % (ref 20–50)
LYMPHOCYTES # BLD AUTO: 1.23 10*3/MM3 (ref 0.7–3.1)
LYMPHOCYTES NFR BLD AUTO: 28.7 % (ref 19.6–45.3)
MCH RBC QN AUTO: 30.3 PG (ref 26.6–33)
MCHC RBC AUTO-ENTMCNC: 32.1 G/DL (ref 31.5–35.7)
MCV RBC AUTO: 94.4 FL (ref 79–97)
MONOCYTES # BLD AUTO: 0.33 10*3/MM3 (ref 0.1–0.9)
MONOCYTES NFR BLD AUTO: 7.7 % (ref 5–12)
NEUTROPHILS NFR BLD AUTO: 2.6 10*3/MM3 (ref 1.7–7)
NEUTROPHILS NFR BLD AUTO: 60.8 % (ref 42.7–76)
NRBC BLD AUTO-RTO: 0 /100 WBC (ref 0–0.2)
PLATELET # BLD AUTO: 195 10*3/MM3 (ref 140–450)
PMV BLD AUTO: 10.6 FL (ref 6–12)
POTASSIUM SERPL-SCNC: 4.6 MMOL/L (ref 3.5–5.2)
PROT SERPL-MCNC: 5.5 G/DL (ref 6–8.5)
PTH-INTACT SERPL-MCNC: 2070 PG/ML (ref 15–65)
QT INTERVAL: 410 MS
QTC INTERVAL: 416 MS
RBC # BLD AUTO: 3.37 10*6/MM3 (ref 3.77–5.28)
SODIUM SERPL-SCNC: 140 MMOL/L (ref 136–145)
TIBC SERPL-MCNC: 246 MCG/DL (ref 298–536)
TRANSFERRIN SERPL-MCNC: 165 MG/DL (ref 200–360)
UFH PPP CHRO-ACNC: 0.25 IU/ML (ref 0.3–0.7)
UFH PPP CHRO-ACNC: 0.34 IU/ML (ref 0.3–0.7)
UFH PPP CHRO-ACNC: 0.45 IU/ML (ref 0.3–0.7)
VIT B12 BLD-MCNC: 750 PG/ML (ref 211–946)
WBC NRBC COR # BLD AUTO: 4.28 10*3/MM3 (ref 3.4–10.8)

## 2024-12-29 PROCEDURE — 85520 HEPARIN ASSAY: CPT

## 2024-12-29 PROCEDURE — 82330 ASSAY OF CALCIUM: CPT | Performed by: INTERNAL MEDICINE

## 2024-12-29 PROCEDURE — 80053 COMPREHEN METABOLIC PANEL: CPT | Performed by: INTERNAL MEDICINE

## 2024-12-29 PROCEDURE — 99232 SBSQ HOSP IP/OBS MODERATE 35: CPT | Performed by: INTERNAL MEDICINE

## 2024-12-29 PROCEDURE — 97162 PT EVAL MOD COMPLEX 30 MIN: CPT

## 2024-12-29 PROCEDURE — 85025 COMPLETE CBC W/AUTO DIFF WBC: CPT | Performed by: INTERNAL MEDICINE

## 2024-12-29 PROCEDURE — 82746 ASSAY OF FOLIC ACID SERUM: CPT | Performed by: INTERNAL MEDICINE

## 2024-12-29 PROCEDURE — 82607 VITAMIN B-12: CPT | Performed by: INTERNAL MEDICINE

## 2024-12-29 PROCEDURE — 97166 OT EVAL MOD COMPLEX 45 MIN: CPT

## 2024-12-29 PROCEDURE — 85520 HEPARIN ASSAY: CPT | Performed by: INTERNAL MEDICINE

## 2024-12-29 PROCEDURE — 83970 ASSAY OF PARATHORMONE: CPT | Performed by: INTERNAL MEDICINE

## 2024-12-29 PROCEDURE — 83540 ASSAY OF IRON: CPT | Performed by: INTERNAL MEDICINE

## 2024-12-29 PROCEDURE — 84466 ASSAY OF TRANSFERRIN: CPT | Performed by: INTERNAL MEDICINE

## 2024-12-29 PROCEDURE — 25010000002 HEPARIN (PORCINE) 25000-0.45 UT/250ML-% SOLUTION: Performed by: INTERNAL MEDICINE

## 2024-12-29 RX ORDER — HYDROCODONE BITARTRATE AND ACETAMINOPHEN 5; 325 MG/1; MG/1
1 TABLET ORAL EVERY 6 HOURS PRN
Status: COMPLETED | OUTPATIENT
Start: 2024-12-29 | End: 2024-12-29

## 2024-12-29 RX ADMIN — HYDROCODONE BITARTRATE AND ACETAMINOPHEN 1 TABLET: 5; 325 TABLET ORAL at 22:00

## 2024-12-29 RX ADMIN — Medication 10 ML: at 08:21

## 2024-12-29 RX ADMIN — HEPARIN SODIUM 18 UNITS/KG/HR: 10000 INJECTION, SOLUTION INTRAVENOUS at 18:16

## 2024-12-29 NOTE — PLAN OF CARE
Goal Outcome Evaluation:  Plan of Care Reviewed With: patient, spouse           Outcome Evaluation: Pt presents below baseline with self care/mobility d/t weakness, decr balance and activity tolerance.   Pt mod A LBD,  mod A commode transfer,  CGA to ambulate with RW.  OT will follow to advance pt toward PLOF.  Recommend IP rehab upon d/c.    Anticipated Discharge Disposition (OT): inpatient rehabilitation facility

## 2024-12-29 NOTE — THERAPY EVALUATION
"Patient Name: Oralia Woodruff  : 1945    MRN: [id number]                              Today's Date: 2024       Admit Date: 2024    Visit Dx:     ICD-10-CM ICD-9-CM   1. Pancreatic mass  K86.89 577.8   2. Acute abdominal pain  R10.9 789.00     338.19   3. Weight loss  R63.4 783.21     Patient Active Problem List   Diagnosis    Pancreatic mass     Past Medical History:   Diagnosis Date    Hypertension      Past Surgical History:   Procedure Laterality Date    CHOLECYSTECTOMY      EYE SURGERY      HYSTERECTOMY        General Information       Row Name 24          Physical Therapy Time and Intention    Document Type evaluation  -LS     Mode of Treatment physical therapy  -LS       Row Name 24          General Information    Patient Profile Reviewed yes  -LS     Prior Level of Function independent:  spouse assists with LBD at times, uses RW or holds to furniture to walk. pt said she has a step stool she uses to get into bed. has a lift chair.  -LS     Existing Precautions/Restrictions fall  B LE DVT - on heparin. Pt said her R knee \"locks up.\"  -LS     Barriers to Rehab medically complex  -LS       Row Name 24          Living Environment    People in Home spouse  -       Row Name 24          Home Main Entrance    Number of Stairs, Main Entrance none  Pt said she walks a longer way aronund the house to avoid having to to up steps.  -       Row Name 24          Stairs Within Home, Primary    Number of Stairs, Within Home, Primary none  -LS       Row Name 24          Cognition    Orientation Status (Cognition) oriented x 4  -LS       Row Name 24          Safety Issues/Impairments Affecting Functional Mobility    Safety Issues Affecting Function (Mobility) insight into deficits/self-awareness;safety precaution awareness  -LS     Impairments Affecting Function (Mobility) balance;endurance/activity tolerance;pain;strength  -LS  "              User Key  (r) = Recorded By, (t) = Taken By, (c) = Cosigned By      Initials Name Provider Type    Mai Courtney PT Physical Therapist                   Mobility       Row Name 12/29/24 0930          Bed Mobility    Bed Mobility sit-supine  -LS     Sit-Supine [place] (Bed Mobility) verbal cues;moderate assist (50% patient effort)  -LS     Assistive Device (Bed Mobility) head of bed elevated  -LS     Comment, (Bed Mobility) assist to bring legs onto bed  -LS       Row Name 12/29/24 0930          Sit-Stand Transfer    Sit-Stand [place] (Transfers) verbal cues;moderate assist (50% patient effort)  -LS     Assistive Device (Sit-Stand Transfers) walker, front-wheeled  -LS     Comment, (Sit-Stand Transfer) sit to stand from low toilet.  -       Row Name 12/29/24 0930          Gait/Stairs (Locomotion)    [place] Level (Gait) verbal cues;contact guard  -LS     Assistive Device (Gait) walker, front-wheeled  -LS     Patient was able to Ambulate yes  -LS     Distance in Feet (Gait) 15  -LS     Deviations/Abnormal Patterns (Gait) gait speed decreased;bilateral deviations;stride length decreased  -LS     Comment, (Gait/Stairs) step-through gait pattern at a slow pace.  -LS               User Key  (r) = Recorded By, (t) = Taken By, (c) = Cosigned By      Initials Name Provider Type    Mai Courtney PT Physical Therapist                   Obj/Interventions       Row Name 12/29/24 0930          Range of Motion Comprehensive    General Range of Motion bilateral lower extremity ROM WFL  -       Row Name 12/29/24 0930          Strength Comprehensive (MMT)    Comment, General Manual Muscle Testing (MMT) Assessment R  hip flexors 3-. L hip flexors 3+. R knee extentsors 3-. L knee extensors 3+. B ankle dorsiflexors 4/5. All MMT limited by pain, particularly R knee extensors. Pt also said her skin is very sensitive to touch.  -       Row Name 12/29/24 0930          Balance    Static  Standing Balance contact guard  -LS     Dynamic Standing Balance contact guard  -LS     Position/Device Used, Standing Balance walker, rolling  -LS     Balance Interventions standing;sit to stand;supported;weight shifting activity  -LS               User Key  (r) = Recorded By, (t) = Taken By, (c) = Cosigned By      Initials Name Provider Type    LS Mai Toney, PT Physical Therapist                   Goals/Plan       Row Name 12/29/24 0930          Bed Mobility Goal 1 (PT)    Activity/Assistive Device (Bed Mobility Goal 1, PT) sit to supine/supine to sit  -LS     [place] Level/Cues Needed (Bed Mobility Goal 1, PT) minimum assist (75% or more patient effort)  -LS     Time Frame (Bed Mobility Goal 1, PT) short term goal (STG);5 days  -LS     Progress/Outcomes (Bed Mobility Goal 1, PT) goal ongoing  -LS       Row Name 12/29/24 0930          Transfer Goal 1 (PT)    Activity/Assistive Device (Transfer Goal 1, PT) sit-to-stand/stand-to-sit;walker, rolling  -LS     [place] Level/Cues Needed (Transfer Goal 1, PT) minimum assist (75% or more patient effort)  -LS     Time Frame (Transfer Goal 1, PT) long term goal (LTG);10 days  -LS       Row Name 12/29/24 0930          Gait Training Goal 1 (PT)    Activity/Assistive Device (Gait Training Goal 1, PT) gait (walking locomotion);assistive device use;walker, rolling  -LS     [place] Level (Gait Training Goal 1, PT) standby assist  -LS     Distance (Gait Training Goal 1, PT) 100  -LS     Time Frame (Gait Training Goal 1, PT) long term goal (LTG);10 days  -LS     Progress/Outcome (Gait Training Goal 1, PT) goal ongoing  -       Row Name 12/29/24 0930          Therapy Assessment/Plan (PT)    Planned Therapy Interventions (PT) balance training;bed mobility training;gait training;home exercise program;patient/family education;transfer training;strengthening  -LS               User Key  (r) = Recorded By, (t) = Taken By, (c) = Cosigned By      Initials Name  "Provider Type    Mai Courtney, PT Physical Therapist                   Clinical Impression       Row Name 12/29/24 0930          Pain    Pain Location knee  -LS     Pain Side/Orientation right  -LS     Pain Management Interventions positioning techniques utilized  -LS     Response to Pain Interventions activity participation with tolerable pain  -       Row Name 12/29/24 0930          Pain Scale: FACES Pre/Post-Treatment    Pain: FACES Scale, Pretreatment 4-->hurts little more  -LS     Posttreatment Pain Rating 4-->hurts little more  -LS       Row Name 12/29/24 0930          Plan of Care Review    Plan of Care Reviewed With patient  -LS     Outcome Evaluation Pt presents with below baseline level of function. She has impaired strength and balance. She has R knee pain and said that her right knee \"locks up\" on her. She has trouble particularly with transitioning sit to stand. Skin is very sensitive to touch BLEs. Pt would benefit from skilled PT services to improve function. Recommend inpt rehab upon discharge.  -       Row Name 12/29/24 0930          Therapy Assessment/Plan (PT)    Patient/Family Therapy Goals Statement (PT) Return to PLOF.  -LS     Rehab Potential (PT) good  -LS     Criteria for Skilled Interventions Met (PT) yes;meets criteria  -     Therapy Frequency (PT) daily  -     Predicted Duration of Therapy Intervention (PT) 10 days  -       Row Name 12/29/24 0930          Vital Signs    Pre Patient Position Sitting  -LS     Post Patient Position Supine  -       Row Name 12/29/24 0930          Positioning and Restraints    Pre-Treatment Position bathroom  -LS     Post Treatment Position bed  -LS     In Bed notified nsg;sitting;call light within reach;encouraged to call for assist;exit alarm on;with family/caregiver  -               User Key  (r) = Recorded By, (t) = Taken By, (c) = Cosigned By      Initials Name Provider Type    Mai Courtney, PT Physical Therapist      "              Outcome Measures       Row Name 12/29/24 0930          How much help from another person do you currently need...    Turning from your back to your side while in flat bed without using bedrails? 4  -LS     Moving from lying on back to sitting on the side of a flat bed without bedrails? 2  -LS     Moving to and from a bed to a chair (including a wheelchair)? 2  -LS     Standing up from a chair using your arms (e.g., wheelchair, bedside chair)? 2  -LS     Climbing 3-5 steps with a railing? 2  -LS     To walk in hospital room? 3  -LS     AM-PAC 6 Clicks Score (PT) 15  -LS     Highest Level of Mobility Goal 4 --> Transfer to chair/commode  -       Row Name 12/29/24 1035 12/29/24 0930       Functional Assessment    Outcome Measure Options AM-PAC 6 Clicks Daily Activity (OT)  - AM-PAC 6 Clicks Basic Mobility (PT)  -              User Key  (r) = Recorded By, (t) = Taken By, (c) = Cosigned By      Initials Name Provider Type    AC Giselle Gallegos, OT Occupational Therapist    Mai Courtney, PT Physical Therapist                                 Physical Therapy Education       Title: PT OT SLP Therapies (In Progress)       Topic: Physical Therapy (In Progress)       Point: Mobility training (Done)       Learning Progress Summary            Patient Acceptance, E, VU,NR by  at 12/29/2024 0930                      Point: Home exercise program (Not Started)       Learner Progress:  Not documented in this visit.              Point: Body mechanics (Not Started)       Learner Progress:  Not documented in this visit.              Point: Precautions (Not Started)       Learner Progress:  Not documented in this visit.                              User Key       Initials Effective Dates Name Provider Type Discipline     07/11/23 -  Mai Toney, PT Physical Therapist PT                  PT Recommendation and Plan  Planned Therapy Interventions (PT): balance training, bed mobility training, gait  "training, home exercise program, patient/family education, transfer training, strengthening  Outcome Evaluation: Pt presents with below baseline level of function. She has impaired strength and balance. She has R knee pain and said that her right knee \"locks up\" on her. She has trouble particularly with transitioning sit to stand. Skin is very sensitive to touch BLEs. Pt would benefit from skilled PT services to improve function. Recommend inpt rehab upon discharge.     Time Calculation:   PT Evaluation Complexity  History, PT Evaluation Complexity: 3 or more personal factors and/or comorbidities  Examination of Body Systems (PT Eval Complexity): total of 4 or more elements  Clinical Presentation (PT Evaluation Complexity): evolving  Clinical Decision Making (PT Evaluation Complexity): moderate complexity  Overall Complexity (PT Evaluation Complexity): moderate complexity     PT Charges       Row Name 12/29/24 0930             Time Calculation    Start Time 0930  -LS      PT Received On 12/29/24  -LS      PT Goal Re-Cert Due Date 01/08/25  -LS         Untimed Charges    PT Eval/Re-eval Minutes 59  -LS         Total Minutes    Untimed Charges Total Minutes 59  -LS       Total Minutes 59  -LS                User Key  (r) = Recorded By, (t) = Taken By, (c) = Cosigned By      Initials Name Provider Type    Mai Courtney, PT Physical Therapist                  Therapy Charges for Today       Code Description Service Date Service Provider Modifiers Qty    15736011231 HC PT EVAL MOD COMPLEXITY 4 12/29/2024 Mai Toney, PT GP 1            PT G-Codes  Outcome Measure Options: AM-PAC 6 Clicks Daily Activity (OT)  AM-PAC 6 Clicks Score (PT): 15  AM-PAC 6 Clicks Score (OT): 17  PT Discharge Summary  Anticipated Discharge Disposition (PT): inpatient rehabilitation facility    Mai Toney, PT  12/29/2024    "

## 2024-12-29 NOTE — PROGRESS NOTES
Pharmacy to Dose Heparin Infusion Note    Oralia Woodruff is a 79 y.o. female receiving heparin infusion.     Therapy for (VTE/Cardiac): VTE  Patient Weight: 49.9  Initial Bolus (Y/N): No  Any Bolus (Y/N): Yes     Signs or Symptoms of Bleeding: No    VTE (PE/DVT)   Initial Bolus: 80 units/kg (Max 10,000 units)  Initial rate: 18 units/kg/hr (Max 1,500 units/hr)    Anti-Xa Bolus   Dose Infusion Hold   Time Infusion Rate Change (units/kg/hr) Repeat  Anti-Xa   < 0.11 50 units/kg  (4000 units Max) None Increase by  4 units/kg/hr 6 hours   0.11 - 0.19 25 units/kg  (2000 units Max) None Increase by  3 units/kg/hr 6 hours   0.2 - 0.29 0 None Increase by  2 units/kg/hr 6 hours   0.3 - 0.7 0 None No Change 6 hours (after 2 consecutive levels in range check qAM)   0.71 - 0.8 0 None Decrease by  1 units/kg/hr 6 hours   0.81 - 0.9 0 None Decrease by  2 units/kg/hr 6 hours   0.91 - 1 0 60 minutes Decrease by  3 units/kg/hr 6 hours   >1 0 Hold  After Anti-Xa less than 0.7 decrease previous rate by  4 units/kg/hr  Every 2 hours until Anti-Xa less than 0.7 then when infusion restarts in 6 hours     Results from last 7 days   Lab Units 12/29/24  1105 12/28/24  2144 12/28/24  2140 12/28/24  0411   INR   --  1.12  --   --    HEMOGLOBIN g/dL 10.2*  --  9.8* 10.2*   HEMATOCRIT % 31.8*  --  30.8* 32.7*   PLATELETS 10*3/mm3 195  --  193 194       Date   Time   Anti-Xa Current Rate (units/kg/hr) Bolus   (units) Rate Change   (units/kg/hr) New Rate (units/kg/hr) Repeat  Anti-Xa Comments /  Pump Check    12/28 1749 0.1 -- No initial +18 18 0000 DW RN   12/29 0044 0.25 18 -- -- 18 0800 Spoke with Ally;  Note almost therapeutic without initial bolus   12/29 1105 0.45 18 -- -- 18 1800 DW CYNTHIA Blank[place], [facility]  12/29/2024  11:34 EST

## 2024-12-29 NOTE — PLAN OF CARE
"Goal Outcome Evaluation:  Plan of Care Reviewed With: patient           Outcome Evaluation: Pt presents with below baseline level of function. She has impaired strength and balance. She has R knee pain and said that her right knee \"locks up\" on her. She has trouble particularly with transitioning sit to stand. Skin is very sensitive to touch BLEs. Pt would benefit from skilled PT services to improve function. Recommend inpt rehab upon discharge.    Anticipated Discharge Disposition (PT): inpatient rehabilitation facility                        "

## 2024-12-29 NOTE — THERAPY EVALUATION
Patient Name: Oralia Woodruff  : 1945    MRN: [id number]                              Today's Date: 2024       Admit Date: 2024    Visit Dx:     ICD-10-CM ICD-9-CM   1. Pancreatic mass  K86.89 577.8   2. Acute abdominal pain  R10.9 789.00     338.19   3. Weight loss  R63.4 783.21     Patient Active Problem List   Diagnosis    Pancreatic mass     Past Medical History:   Diagnosis Date    Hypertension      Past Surgical History:   Procedure Laterality Date    CHOLECYSTECTOMY      EYE SURGERY      HYSTERECTOMY        General Information       Row Name 2435          OT Time and Intention    Document Type evaluation  -AC     Mode of Treatment occupational therapy  -AC       Row Name 24          General Information    Patient Profile Reviewed yes  -AC     Prior Level of Function independent:;all household mobility;community mobility;feeding;grooming;bathing;min assist:;dressing  spouse assists with LBD at times, uses RW to ambulate  -AC     Existing Precautions/Restrictions fall  B LE DVT - on heparin  -AC     Barriers to Rehab medically complex  -AC       Row Name 24          Occupational Profile    Environmental Supports and Barriers (Occupational Profile) walk in shower with shower chair, ETS  -AC       Row Name 24          Living Environment    People in Home spouse  -AC       Row Name 24          Home Main Entrance    Number of Stairs, Main Entrance none  -AC       Row Name 2435          Stairs Within Home, Primary    Stairs, Within Home, Primary 1 story  -AC       Row Name 24          Cognition    Orientation Status (Cognition) oriented x 4  -AC       Row Name 24          Safety Issues/Impairments Affecting Functional Mobility    Safety Issues Affecting Function (Mobility) insight into deficits/self-awareness;safety precaution awareness  -AC     Impairments Affecting Function (Mobility) balance;endurance/activity  Pharmacy called stating that insurance will not cover Venlafaxine 75 mg 24 hr 1 capsule nightly. Pharmacist, Flakita stated that it would be best to change prescription to 150 mg 1 capsule nightly. Verbal response received from Dr. Macias to change prescription to Venlafaxine 150 mg capsule take 1 capsule nightly.    tolerance;pain;range of motion (ROM);strength  -               User Key  (r) = Recorded By, (t) = Taken By, (c) = Cosigned By      Initials Name Provider Type     Giselle Gallegos OT Occupational Therapist                     Mobility/ADL's       Row Name 12/29/24 0935          Bed Mobility    Bed Mobility sit-supine  -     Sit-Supine [place] (Bed Mobility) verbal cues;moderate assist (50% patient effort)  -     Bed Mobility, Safety Issues decreased use of legs for bridging/pushing  -     Assistive Device (Bed Mobility) head of bed elevated  -     Comment, (Bed Mobility) assist with legs  -       Row Name 12/29/24 0935          Transfers    Transfers sit-stand transfer;toilet transfer  -       Row Name 12/29/24 09          Toilet Transfer    Type (Toilet Transfer) sit-stand  -     [place] Level (Toilet Transfer) verbal cues;moderate assist (50% patient effort)  low toilet  -     Assistive Device (Toilet Transfer) walker, front-wheeled  -       Row Name 12/29/24 09          Functional Mobility    Functional Mobility- Ind. Level verbal cues required;contact guard assist  -     Functional Mobility- Device walker, front-wheeled  -     Functional Mobility-Distance (Feet) 15  -     Functional Mobility- Safety Issues step length decreased  -       Row Name 12/29/24 0935          Activities of Daily Living    BADL Assessment/Intervention lower body dressing;toileting  -       Row Name 12/29/24 09          Lower Body Dressing Assessment/Training    [place] Level (Lower Body Dressing) don;socks;moderate assist (50% patient effort)  -     Position (Lower Body Dressing) sitting up in bed;long sitting  -       Row Name 12/29/24 0935          Toileting Assessment/Training    [place] Level (Toileting) adjust/manage clothing;perform perineal hygiene;minimum assist (75% patient effort)  -     Assistive Devices (Toileting) commode;grab bar/safety frame  -     Position  (Toileting) unsupported sitting;supported standing  -AC               User Key  (r) = Recorded By, (t) = Taken By, (c) = Cosigned By      Initials Name Provider Type    Giselle Wood OT Occupational Therapist                   Obj/Interventions       Row Name 12/29/24 0935          Sensory Assessment (Somatosensory)    Sensory Assessment (Somatosensory) UE sensation intact  -       Row Name 12/29/24 0935          Vision Assessment/Intervention    Visual Impairment/Limitations WFL  -       Row Name 12/29/24 0935          Range of Motion Comprehensive    General Range of Motion bilateral upper extremity ROM WNL  -       Row Name 12/29/24 0935          Strength Comprehensive (MMT)    General Manual Muscle Testing (MMT) Assessment upper extremity strength deficits identified  -AC     Comment, General Manual Muscle Testing (MMT) Assessment BUE grossly 4/5  -       Row Name 12/29/24 0935          Balance    Balance Assessment sitting static balance;standing static balance;sitting dynamic balance;standing dynamic balance  -AC     Static Sitting Balance standby assist  -AC     Dynamic Sitting Balance contact guard  -AC     Position, Sitting Balance unsupported  -AC     Static Standing Balance verbal cues;contact guard  -AC     Dynamic Standing Balance verbal cues;contact guard  -AC     Position/Device Used, Standing Balance walker, front-wheeled  -AC     Balance Interventions sitting  -AC               User Key  (r) = Recorded By, (t) = Taken By, (c) = Cosigned By      Initials Name Provider Type    Giselle Wood OT Occupational Therapist                   Goals/Plan       Row Name 12/29/24 1033          Transfer Goal 1 (OT)    Activity/Assistive Device (Transfer Goal 1, OT) bed-to-chair/chair-to-bed;toilet;walker, rolling  -AC     [place] Level/Cues Needed (Transfer Goal 1, OT) verbal cues required;contact guard required  -AC     Time Frame (Transfer Goal 1, OT) long term goal (LTG);10 days  -      Progress/Outcome (Transfer Goal 1, OT) new goal;goal ongoing  -AC       Row Name 12/29/24 1033          Toileting Goal 1 (OT)    Activity/Device (Toileting Goal 1, OT) adjust/manage clothing;perform perineal hygiene  -AC     [place] Level/Cues Needed (Toileting Goal 1, OT) standby assist  -AC     Time Frame (Toileting Goal 1, OT) short term goal (STG);5 days  -AC     Progress/Outcome (Toileting Goal 1, OT) new goal;goal ongoing  -AC       Row Name 12/29/24 1033          Grooming Goal 1 (OT)    Activity/Device (Grooming Goal 1, OT) hair care;oral care;wash face, hands  -AC     [place] (Grooming Goal 1, OT) standby assist  -AC     Time Frame (Grooming Goal 1, OT) short term goal (STG);5 days  -AC     Strategies/Barriers (Grooming Goal 1, OT) standing sink side  -AC     Progress/Outcome (Grooming Goal 1, OT) new goal;goal ongoing  -AC       Row Name 12/29/24 1033          Therapy Assessment/Plan (OT)    Planned Therapy Interventions (OT) activity tolerance training;BADL retraining;functional balance retraining;occupation/activity based interventions;patient/caregiver education/training;strengthening exercise;transfer/mobility retraining  -AC               User Key  (r) = Recorded By, (t) = Taken By, (c) = Cosigned By      Initials Name Provider Type    AC Giselle Gallegos OT Occupational Therapist                   Clinical Impression       Row Name 12/29/24 0970          Pain Assessment    Pain Location extremity  -AC     Pain Side/Orientation bilateral;lower  -AC     Pain Management Interventions exercise or physical activity utilized  -AC     Response to Pain Interventions activity participation with tolerable pain  -AC     Additional Documentation Pain Scale: FACES Pre/Post-Treatment (Group)  -       Row Name 12/29/24 8672          Pain Scale: FACES Pre/Post-Treatment    Pain: FACES Scale, Pretreatment 4-->hurts little more  -AC     Posttreatment Pain Rating 4-->hurts little more  -AC       Row Name  12/29/24 0935          Plan of Care Review    Plan of Care Reviewed With patient;spouse  -     Outcome Evaluation Pt presents below baseline with self care/mobility d/t weakness, decr balance and activity tolerance.   Pt mod A LBD,  mod A commode transfer,  CGA to ambulate with RW.  OT will follow to advance pt toward PLOF.  Recommend IP rehab upon d/c.  -       Row Name 12/29/24 0935          Therapy Assessment/Plan (OT)    Criteria for Skilled Therapeutic Interventions Met (OT) yes;skilled treatment is necessary  -       Row Name 12/29/24 0935          Therapy Plan Review/Discharge Plan (OT)    Anticipated Discharge Disposition (OT) inpatient rehabilitation facility  -       Row Name 12/29/24 0935          Vital Signs    Pre Systolic BP Rehab 154  -AC     Pre Treatment Diastolic BP 90  -AC     Pretreatment Heart Rate (beats/min) 71  -AC     Posttreatment Heart Rate (beats/min) 70  -AC     Pre SpO2 (%) 96  -AC     O2 Delivery Pre Treatment room air  -AC     O2 Delivery Intra Treatment room air  -AC     Post SpO2 (%) 97  -AC     O2 Delivery Post Treatment room air  -AC     Pre Patient Position Sitting  -AC     Post Patient Position Supine  -       Row Name 12/29/24 0935          Positioning and Restraints    Pre-Treatment Position bathroom  -AC     Post Treatment Position bed  -AC     In Bed notified nsg;supine;encouraged to call for assist;exit alarm on;with family/caregiver  -               User Key  (r) = Recorded By, (t) = Taken By, (c) = Cosigned By      Initials Name Provider Type    AC Giselle Gallegos, OT Occupational Therapist                   Outcome Measures       Row Name 12/29/24 1039          How much help from another is currently needed...    Putting on and taking off regular lower body clothing? 2  -AC     Bathing (including washing, rinsing, and drying) 2  -AC     Toileting (which includes using toilet bed pan or urinal) 3  -AC     Putting on and taking off regular upper body clothing 3   -     Taking care of personal grooming (such as brushing teeth) 3  -AC     Eating meals 4  -AC     AM-PAC 6 Clicks Score (OT) 17  -       Row Name 12/29/24 1035          Functional Assessment    Outcome Measure Options AM-PAC 6 Clicks Daily Activity (OT)  -               User Key  (r) = Recorded By, (t) = Taken By, (c) = Cosigned By      Initials Name Provider Type     Giselle Gallegos, OT Occupational Therapist                    Occupational Therapy Education       Title: PT OT SLP Therapies (In Progress)       Topic: Occupational Therapy (In Progress)       Point: ADL training (In Progress)       Description:   Instruct learner(s) on proper safety adaptation and remediation techniques during self care or transfers.   Instruct in proper use of assistive devices.                  Learning Progress Summary            Patient Acceptance, E, NR by  at 12/29/2024 1035                      Point: Home exercise program (Not Started)       Description:   Instruct learner(s) on appropriate technique for monitoring, assisting and/or progressing therapeutic exercises/activities.                  Learner Progress:  Not documented in this visit.              Point: Precautions (Not Started)       Description:   Instruct learner(s) on prescribed precautions during self-care and functional transfers.                  Learner Progress:  Not documented in this visit.              Point: Body mechanics (Not Started)       Description:   Instruct learner(s) on proper positioning and spine alignment during self-care, functional mobility activities and/or exercises.                  Learner Progress:  Not documented in this visit.                              User Key       Initials Effective Dates Name Provider Type [facility] 02/03/23 -  Giselle Gallegos, OT Occupational Therapist OT                  OT Recommendation and Plan  Planned Therapy Interventions (OT): activity tolerance training, BADL retraining, functional  balance retraining, occupation/activity based interventions, patient/caregiver education/training, strengthening exercise, transfer/mobility retraining  Plan of Care Review  Plan of Care Reviewed With: patient, spouse  Outcome Evaluation: Pt presents below baseline with self care/mobility d/t weakness, decr balance and activity tolerance.   Pt mod A LBD,  mod A commode transfer,  CGA to ambulate with RW.  OT will follow to advance pt toward PLOF.  Recommend IP rehab upon d/c.     Time Calculation:   Evaluation Complexity (OT)  Review Occupational Profile/Medical/Therapy History Complexity: expanded/moderate complexity  Assessment, Occupational Performance/Identification of Deficit Complexity: 3-5 performance deficits  Clinical Decision Making Complexity (OT): detailed assessment/moderate complexity  Overall Complexity of Evaluation (OT): moderate complexity     Time Calculation- OT       Row Name 12/29/24 0935             Time Calculation- OT    OT Start Time 0935  -AC      OT Received On 12/29/24  -AC      OT Goal Re-Cert Due Date 01/08/25  -AC         Untimed Charges    OT Eval/Re-eval Minutes 50  -AC         Total Minutes    Untimed Charges Total Minutes 50  -AC       Total Minutes 50  -AC                User Key  (r) = Recorded By, (t) = Taken By, (c) = Cosigned By      Initials Name Provider Type    AC Giselle Gallegos, OT Occupational Therapist                  Therapy Charges for Today       Code Description Service Date Service Provider Modifiers Qty    [id number]  OT EVAL MOD COMPLEXITY 4 12/29/2024 Giselle Gallegos OT GO 1                 Giselle Gallegos OT  12/29/2024

## 2024-12-29 NOTE — PROGRESS NOTES
DATE OF VISIT: 12/29/2024    Chief Complaint: Followup for unexplained weight loss     SUBJECTIVE: The patient is laying comfortable in bed.  She is feeling better.    REVIEW OF SYSTEMS: All the other 9 systems are reviewed by me and negative  except what is mentioned in HPI.     Past History:  Medical History: has a past medical history of Hypertension.   Surgical History: has a past surgical history that includes Cholecystectomy; Hysterectomy; and Eye surgery.   Family History: family history is not on file.   Social History: reports that she has never smoked. She has never used smokeless tobacco. She reports that she does not drink alcohol and does not use drugs.    Medications Prior to Admission   Medication Sig Dispense Refill Last Dose/Taking    losartan (COZAAR) 25 MG tablet Take 2 tablets by mouth 2 (Two) Times a Day. 60 tablet 0     MULTIPLE VITAMINS-MINERALS PO Take  by mouth Daily.         Allergies: Patient has no active allergies.     Current Facility-Administered Medications:     acetaminophen (TYLENOL) tablet 650 mg, 650 mg, Oral, Q4H PRN **OR** acetaminophen (TYLENOL) 160 MG/5ML oral solution 650 mg, 650 mg, Oral, Q4H PRN **OR** acetaminophen (TYLENOL) suppository 650 mg, 650 mg, Rectal, Q4H PRN, Kerley, Brian Joseph,     Calcium Replacement - Follow Nurse / BPA Driven Protocol, , Not Applicable, PRN, Kerley, Brian Joseph, DO    heparin 95663 units/250 mL (100 units/mL) in 0.45 % NaCl infusion, 18 Units/kg/hr, Intravenous, Titrated, Lilly Awad DO, Last Rate: 8.98 mL/hr at 12/28/24 1814, 18 Units/kg/hr at 12/28/24 1814    Magnesium Standard Dose Replacement - Follow Nurse / BPA Driven Protocol, , Not Applicable, PRN, Kerley, Brian Joseph, DO    nitroglycerin (NITROSTAT) SL tablet 0.4 mg, 0.4 mg, Sublingual, Q5 Min PRN, Kerley, Brian Joseph, DO    ondansetron (ZOFRAN) injection 4 mg, 4 mg, Intravenous, Q6H PRN, Kerley, Brian Joseph, DO    Pharmacy to Dose Heparin, , Not Applicable,  "Continuous PRN, Lilly Awad,     Phosphorus Replacement - Follow Nurse / BPA Driven Protocol, , Not Applicable, PRN, Kerley, Brian Joseph,     Potassium Replacement - Follow Nurse / BPA Driven Protocol, , Not Applicable, PRN, Kerley, Brian Joseph,     Sodium Chloride (PF) 0.9 % 10 mL, 10 mL, Intravenous, PRN, Kerley, Brian Joseph,     sodium chloride 0.9 % flush 10 mL, 10 mL, Intravenous, Q12H, Kerley, Brian Joseph, , 10 mL at 12/29/24 0821    sodium chloride 0.9 % flush 10 mL, 10 mL, Intravenous, PRN, Kerley, Brian Joseph,     sodium chloride 0.9 % infusion 40 mL, 40 mL, Intravenous, PRN, Kerley, Brian Joseph,     PHYSICAL EXAMINATION:   /69 (BP Location: Left arm, Patient Position: Lying)   Pulse 68   Temp 97 °F (36.1 °C) (Oral)   Resp 16   Ht 162.6 cm (64\")   Wt 49.9 kg (110 lb)   SpO2 97%   BMI 18.88 kg/m²                ECOG Performance Status: 2 - Symptomatic, <50% confined to bed  GENERAL: Age appropriate. No acute distress.   NEURO/PSYCH: A&O x 3, strength 5/5 in all muscle groups  HEENT: Head atraumatic, normocephalic.   NECK: Supple. No JVD. No lymphadenopathy.   LUNGS: Clear to auscultation bilaterally. No wheezing. No rhonchi.   HEART: Regular rate and rhythm. S1, S2, no murmurs.   ABDOMEN: Soft, nontender, nondistended. Bowel sounds positive. No  hepatosplenomegaly.   EXTREMITIES: No clubbing, cyanosis, or edema.   SKIN: No rashes. No purpura.       No results displayed because visit has over 200 results.          No results found.    ASSESSMENT: The patient is a very pleasant 79 y.o. female  with unexplained weight loss      PLAN:  1.  Unexplained weight loss:  A.  Bone scan is pending.  B.  CA 19-9 essentially normal.  I will follow-up on the rest of tumor markers.    2.  Hypercalcemia:  A.  Status post 1 dose of Zometa given December 28, 2024.  B.  PTH is elevated.  We will arrange for neck ultrasound and possibly general surgery consult.    3.  Right lower " extremity DVT:  A.  Continue anticoagulation with heparin for now.      Emory Melo MD  12/29/2024

## 2024-12-30 ENCOUNTER — APPOINTMENT (OUTPATIENT)
Dept: NUCLEAR MEDICINE | Facility: HOSPITAL | Age: 79
End: 2024-12-30
Payer: MEDICARE

## 2024-12-30 LAB
ALBUMIN SERPL ELPH-MCNC: 3.2 G/DL (ref 2.9–4.4)
ALBUMIN/GLOB SERPL: 1.5 {RATIO} (ref 0.7–1.7)
ALPHA1 GLOB SERPL ELPH-MCNC: 0.2 G/DL (ref 0–0.4)
ALPHA2 GLOB SERPL ELPH-MCNC: 0.5 G/DL (ref 0.4–1)
ANION GAP SERPL CALCULATED.3IONS-SCNC: 9 MMOL/L (ref 5–15)
B-GLOBULIN SERPL ELPH-MCNC: 0.8 G/DL (ref 0.7–1.3)
BASOPHILS # BLD AUTO: 0.03 10*3/MM3 (ref 0–0.2)
BASOPHILS NFR BLD AUTO: 0.5 % (ref 0–1.5)
BUN SERPL-MCNC: 34 MG/DL (ref 8–23)
BUN/CREAT SERPL: 21.9 (ref 7–25)
CALCIUM SPEC-SCNC: 9.3 MG/DL (ref 8.6–10.5)
CHLORIDE SERPL-SCNC: 109 MMOL/L (ref 98–107)
CO2 SERPL-SCNC: 21 MMOL/L (ref 22–29)
CREAT SERPL-MCNC: 1.55 MG/DL (ref 0.57–1)
DEPRECATED RDW RBC AUTO: 47.2 FL (ref 37–54)
EGFRCR SERPLBLD CKD-EPI 2021: 33.7 ML/MIN/1.73
EOSINOPHIL # BLD AUTO: 0.12 10*3/MM3 (ref 0–0.4)
EOSINOPHIL NFR BLD AUTO: 2.1 % (ref 0.3–6.2)
ERYTHROCYTE [DISTWIDTH] IN BLOOD BY AUTOMATED COUNT: 13.5 % (ref 12.3–15.4)
GAMMA GLOB SERPL ELPH-MCNC: 0.8 G/DL (ref 0.4–1.8)
GLOBULIN SER CALC-MCNC: 2.2 G/DL (ref 2.2–3.9)
GLUCOSE SERPL-MCNC: 96 MG/DL (ref 65–99)
HCT VFR BLD AUTO: 30 % (ref 34–46.6)
HGB BLD-MCNC: 9.3 G/DL (ref 12–15.9)
IMM GRANULOCYTES # BLD AUTO: 0.01 10*3/MM3 (ref 0–0.05)
IMM GRANULOCYTES NFR BLD AUTO: 0.2 % (ref 0–0.5)
KAPPA LC FREE SER-MCNC: 51.5 MG/L (ref 3.3–19.4)
KAPPA LC FREE/LAMBDA FREE SER: 1.59 {RATIO} (ref 0.26–1.65)
LABORATORY COMMENT REPORT: ABNORMAL
LAMBDA LC FREE SERPL-MCNC: 32.4 MG/L (ref 5.7–26.3)
LYMPHOCYTES # BLD AUTO: 1.79 10*3/MM3 (ref 0.7–3.1)
LYMPHOCYTES NFR BLD AUTO: 31.9 % (ref 19.6–45.3)
M PROTEIN SERPL ELPH-MCNC: ABNORMAL G/DL
MCH RBC QN AUTO: 29.5 PG (ref 26.6–33)
MCHC RBC AUTO-ENTMCNC: 31 G/DL (ref 31.5–35.7)
MCV RBC AUTO: 95.2 FL (ref 79–97)
MONOCYTES # BLD AUTO: 0.48 10*3/MM3 (ref 0.1–0.9)
MONOCYTES NFR BLD AUTO: 8.6 % (ref 5–12)
NEUTROPHILS NFR BLD AUTO: 3.18 10*3/MM3 (ref 1.7–7)
NEUTROPHILS NFR BLD AUTO: 56.7 % (ref 42.7–76)
NRBC BLD AUTO-RTO: 0 /100 WBC (ref 0–0.2)
PLATELET # BLD AUTO: 183 10*3/MM3 (ref 140–450)
PMV BLD AUTO: 10.8 FL (ref 6–12)
POTASSIUM SERPL-SCNC: 4.1 MMOL/L (ref 3.5–5.2)
PROT PATTERN SERPL ELPH-IMP: ABNORMAL
PROT SERPL-MCNC: 5.4 G/DL (ref 6–8.5)
RBC # BLD AUTO: 3.15 10*6/MM3 (ref 3.77–5.28)
SODIUM SERPL-SCNC: 139 MMOL/L (ref 136–145)
UFH PPP CHRO-ACNC: 0.42 IU/ML (ref 0.3–0.7)
UFH PPP CHRO-ACNC: 0.47 IU/ML (ref 0.3–0.7)
WBC NRBC COR # BLD AUTO: 5.61 10*3/MM3 (ref 3.4–10.8)

## 2024-12-30 PROCEDURE — 99232 SBSQ HOSP IP/OBS MODERATE 35: CPT | Performed by: INTERNAL MEDICINE

## 2024-12-30 PROCEDURE — 78306 BONE IMAGING WHOLE BODY: CPT

## 2024-12-30 PROCEDURE — A9503 TC99M MEDRONATE: HCPCS | Performed by: INTERNAL MEDICINE

## 2024-12-30 PROCEDURE — 34310000005 TECHNETIUM MEDRONATE KIT: Performed by: INTERNAL MEDICINE

## 2024-12-30 PROCEDURE — 85520 HEPARIN ASSAY: CPT

## 2024-12-30 PROCEDURE — 80048 BASIC METABOLIC PNL TOTAL CA: CPT | Performed by: STUDENT IN AN ORGANIZED HEALTH CARE EDUCATION/TRAINING PROGRAM

## 2024-12-30 PROCEDURE — 85025 COMPLETE CBC W/AUTO DIFF WBC: CPT | Performed by: STUDENT IN AN ORGANIZED HEALTH CARE EDUCATION/TRAINING PROGRAM

## 2024-12-30 PROCEDURE — 85520 HEPARIN ASSAY: CPT | Performed by: INTERNAL MEDICINE

## 2024-12-30 PROCEDURE — 25010000002 HEPARIN (PORCINE) 25000-0.45 UT/250ML-% SOLUTION: Performed by: INTERNAL MEDICINE

## 2024-12-30 RX ORDER — TC 99M MEDRONATE 20 MG/10ML
29.5 INJECTION, POWDER, LYOPHILIZED, FOR SOLUTION INTRAVENOUS
Status: COMPLETED | OUTPATIENT
Start: 2024-12-30 | End: 2024-12-30

## 2024-12-30 RX ORDER — HYDROCODONE BITARTRATE AND ACETAMINOPHEN 5; 325 MG/1; MG/1
1 TABLET ORAL EVERY 6 HOURS PRN
Status: COMPLETED | OUTPATIENT
Start: 2024-12-30 | End: 2024-12-30

## 2024-12-30 RX ADMIN — HEPARIN SODIUM 18 UNITS/KG/HR: 10000 INJECTION, SOLUTION INTRAVENOUS at 19:03

## 2024-12-30 RX ADMIN — HYDROCODONE BITARTRATE AND ACETAMINOPHEN 1 TABLET: 5; 325 TABLET ORAL at 09:27

## 2024-12-30 RX ADMIN — HYDROCODONE BITARTRATE AND ACETAMINOPHEN 1 TABLET: 5; 325 TABLET ORAL at 20:12

## 2024-12-30 RX ADMIN — Medication 10 ML: at 09:27

## 2024-12-30 RX ADMIN — TC 99M MEDRONATE 29.5 MILLICURIE: 20 INJECTION, POWDER, LYOPHILIZED, FOR SOLUTION INTRAVENOUS at 10:35

## 2024-12-30 NOTE — PROGRESS NOTES
Pharmacy to Dose Heparin Infusion Note    Oralia Woodruff is a 79 y.o. female receiving heparin infusion.     Therapy for (VTE/Cardiac): VTE  Patient Weight: 49.9  Initial Bolus (Y/N): No  Any Bolus (Y/N): Yes     Signs or Symptoms of Bleeding: No    VTE (PE/DVT)   Initial Bolus: 80 units/kg (Max 10,000 units)  Initial rate: 18 units/kg/hr (Max 1,500 units/hr)    Anti-Xa Bolus   Dose Infusion Hold   Time Infusion Rate Change (units/kg/hr) Repeat  Anti-Xa   < 0.11 50 units/kg  (4000 units Max) None Increase by  4 units/kg/hr 6 hours   0.11 - 0.19 25 units/kg  (2000 units Max) None Increase by  3 units/kg/hr 6 hours   0.2 - 0.29 0 None Increase by  2 units/kg/hr 6 hours   0.3 - 0.7 0 None No Change 6 hours (after 2 consecutive levels in range check qAM)   0.71 - 0.8 0 None Decrease by  1 units/kg/hr 6 hours   0.81 - 0.9 0 None Decrease by  2 units/kg/hr 6 hours   0.91 - 1 0 60 minutes Decrease by  3 units/kg/hr 6 hours   >1 0 Hold  After Anti-Xa less than 0.7 decrease previous rate by  4 units/kg/hr  Every 2 hours until Anti-Xa less than 0.7 then when infusion restarts in 6 hours     Results from last 7 days   Lab Units 12/30/24  0335 12/29/24  1105 12/28/24  2144 12/28/24  2140   INR   --   --  1.12  --    HEMOGLOBIN g/dL 9.3* 10.2*  --  9.8*   HEMATOCRIT % 30.0* 31.8*  --  30.8*   PLATELETS 10*3/mm3 183 195  --  193       Date   Time   Anti-Xa Current Rate (units/kg/hr) Bolus   (units) Rate Change   (units/kg/hr) New Rate (units/kg/hr) Repeat  Anti-Xa Comments /  Pump Check    12/28 1749 0.1 -- No initial +18 18 0000 DW RN   12/29 0044 0.25 18 -- -- 18 0800 Spoke with Ally;  Note almost therapeutic without initial bolus   12/29 1105 0.45 18 -- -- 18 1800 DW RN   12/29 1846 0.34 18 -- -- 18 0100  RN   12/30 0110 0.42 18 -- -- 18 1200 [facility]    12/30 1138 0.47 18 -- -- 18 0600  RN                                                                                                                                                                         Tee Templeton, [facility]  12/30/2024  12:32 EST

## 2024-12-30 NOTE — PROGRESS NOTES
[facility] Medicine Services  PROGRESS NOTE    Patient Name: Oralia Woodruff  : 1945  MRN: [id number]    Date of Admission: 2024  Primary Care Physician: Yanni Olsen MD    Subjective   Subjective     CC:  hypercalcemia    HPI:  Overnight patient was refusing all scans.  Was able to convince her daughter about need to do some type of imaging to help guide treatment      Objective   Objective     Vital Signs:   Temp:  [97.2 °F (36.2 °C)-98.3 °F (36.8 °C)] 98 °F (36.7 °C)  Heart Rate:  [65-77] 72  Resp:  [16-18] 16  BP: (124-147)/(66-77) 144/72     Physical Exam:  Constitutional: No acute distress, awake, alert  HENT: NCAT, mucous membranes moist  Respiratory: Respiratory effort normal   Musculoskeletal: LE  edema  Psychiatric: Appropriate affect, cooperative  Neurologic: Oriented x 3, speech clear  Skin: No rashes      Results Reviewed:  LAB RESULTS:      Lab 24  1138 24  0335 24  0110 24  1846 24  1105 24  0044 24  2144 24  2144 24  2140 24  0411 24  1839 24  1638 24  1517   WBC  --  5.61  --   --  4.28  --   --   --  5.62 4.97  --   --  6.27   HEMOGLOBIN  --  9.3*  --   --  10.2*  --   --   --  9.8* 10.2*  --   --  11.9*   HEMATOCRIT  --  30.0*  --   --  31.8*  --   --   --  30.8* 32.7*  --   --  37.5   PLATELETS  --  183  --   --  195  --   --   --  193 194  --   --  207   NEUTROS ABS  --  3.18  --   --  2.60  --   --   --  3.30 2.69  --   --  4.45   IMMATURE GRANS (ABS)  --  0.01  --   --  0.01  --   --   --  0.01 0.01  --   --  0.01   LYMPHS ABS  --  1.79  --   --  1.23  --   --   --  1.74 1.68  --   --  1.37   MONOS ABS  --  0.48  --   --  0.33  --   --   --  0.42 0.46  --   --  0.38   EOS ABS  --  0.12  --   --  0.09  --   --   --  0.12 0.10  --   --  0.03   MCV  --  95.2  --   --  94.4  --   --   --  94.2 96.2  --   --  94.7   LACTATE  --   --   --   --   --   --   --   --   --    --   --   --  0.9   PROTIME  --   --   --   --   --   --   --  14.5  --   --   --   --   --    APTT  --   --   --   --   --   --   --  49.2*  --   --   --   --   --    HEPARIN ANTI-XA 0.47  --  0.42 0.34 0.45 0.25*   < > 0.11*  --   --   --   --   --    HSTROP T  --   --   --   --   --   --   --   --   --   --  17* 16*  --     < > = values in this interval not displayed.         Lab 12/30/24  0335 12/29/24  1105 12/28/24  0411 12/27/24  1642 12/27/24  1638   SODIUM 139 140 138  --  135*   POTASSIUM 4.1 4.6 4.1  --  4.7   CHLORIDE 109* 110* 108*  --  105   CO2 21.0* 20.0* 19.0*  --  20.0*   ANION GAP 9.0 10.0 11.0  --  10.0   BUN 34* 30* 36*  --  39*   CREATININE 1.55* 1.70* 1.51* 1.70* 1.60*   EGFR 33.7* 30.2* 34.8*  --  32.5*   GLUCOSE 96 97 87  --  94   CALCIUM 9.3 11.2* 12.1*  --  12.6*   IONIZED CALCIUM  --  1.49*  --   --   --          Lab 12/29/24 1105 12/28/24 2144 12/27/24  1638   TOTAL PROTEIN 5.5*  --  6.7   ALBUMIN 3.4* 3.2 3.9   GLOBULIN 2.1  --  2.8   ALT (SGPT) 9  --  10   AST (SGOT) 18  --  20   BILIRUBIN <0.2  --  0.3   ALK PHOS 388*  --  440*   LIPASE  --   --  86*         Lab 12/28/24 2144 12/27/24  1839 12/27/24  1638   HSTROP T  --  17* 16*   PROTIME 14.5  --   --    INR 1.12  --   --              Lab 12/29/24  1105   IRON 71   IRON SATURATION (TSAT) 29   TIBC 246*   TRANSFERRIN 165*   FOLATE 14.50   VITAMIN B 12 750         Brief Urine Lab Results  (Last result in the past 365 days)        Color   Clarity   Blood   Leuk Est   Nitrite   Protein   CREAT   Urine HCG        12/27/24 1518 Yellow   Clear   Negative   Negative   Negative   30 mg/dL (1+)                   Microbiology Results Abnormal       None            Duplex Venous Lower Extremity - Bilateral CAR    Result Date: 12/28/2024    Acute right lower extremity deep vein thrombosis noted in the popliteal and peroneal.   Acute left lower extremity deep vein thrombosis noted in the soleal.   All other veins appeared normal bilaterally.       Results for orders placed during the hospital encounter of 12/27/24    Adult Transthoracic Echo Complete W/ Cont if Necessary Per Protocol    Interpretation Summary    Left ventricular systolic function is normal. Calculated left ventricular EF = 59.2% Left ventricular ejection fraction appears to be 56 - 60%.    Left ventricular diastolic function was normal.      Current medications:  Scheduled Meds:sodium chloride, 10 mL, Intravenous, Q12H      Continuous Infusions:heparin, 18 Units/kg/hr, Last Rate: 18 Units/kg/hr (12/29/24 1816)  Pharmacy to Dose Heparin,       PRN Meds:.  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        Brief Hospital Course to date:  Oralia Woodruff is a 79 y.o. female with history of hypertension admitted for failure to thrive and significant weight loss.  She was brought in by her daughter who is visiting over the holiday.  CT abdomen/pelvis concerning for a cystic lesion of the pancreas neoplasm. Workup in progress. Oncology following     Abdominal pain  Weight loss  Pancreatic cystic lesion  -Concern for malignancy, oncology consulted  -CA 19-9 elevated but mildly  -Other tumor markers pending  -Bone scan ordered, pending     Mild hypercalcemia  -Improved with IVF  -S/p Zometa times 1 on 12/28  -spep and bone scan pending  -pth elevated, sestamibi ordered     RLE pain  R LE and LLE DVT  -Acute right lower  extremity deep vein thrombosis noted in the popliteal and peroneal.  - Acute left lower extremity deep vein thrombosis noted in the soleal  -heparin drip started 12/28     Renal insufficiency  Unsure of baseline CKD     Anemia, normocytic     Debility, weakness  -PT/OT          Expected Discharge Location and Transportation: [facility]  Expected Discharge   Expected Discharge Date: 12/31/2024; Expected Discharge Time:      VTE Prophylaxis:  Pharmacologic VTE prophylaxis orders are present.         AM-PAC 6 Clicks Score (PT): 15 (12/29/24 2000)    CODE STATUS:   Code Status and Medical Interventions: CPR (Attempt to Resuscitate); Full Support   Ordered at: 12/27/24 2231     Code Status (Patient has no pulse and is not breathing):    CPR (Attempt to Resuscitate)     Medical Interventions (Patient has pulse or is breathing):    Full Support       Lilly Awad, DO  12/30/24

## 2024-12-30 NOTE — PROGRESS NOTES
DATE OF VISIT: 12/30/2024    Chief Complaint: Followup for unexplained weight loss     SUBJECTIVE: The patient is laying comfortable in bed.  She is feeling better.  Back pain is gradually improving.  Denies any fever chills night sweats.    REVIEW OF SYSTEMS: All the other 9 systems are reviewed by me and negative  except what is mentioned in HPI.     Past History:  Medical History: has a past medical history of Hypertension.   Surgical History: has a past surgical history that includes Cholecystectomy; Hysterectomy; and Eye surgery.   Family History: family history is not on file.   Social History: reports that she has never smoked. She has never used smokeless tobacco. She reports that she does not drink alcohol and does not use drugs.    Medications Prior to Admission   Medication Sig Dispense Refill Last Dose/Taking    losartan (COZAAR) 25 MG tablet Take 2 tablets by mouth 2 (Two) Times a Day. 60 tablet 0     MULTIPLE VITAMINS-MINERALS PO Take  by mouth Daily.         Allergies: Patient has no active allergies.     Current Facility-Administered Medications:     acetaminophen (TYLENOL) tablet 650 mg, 650 mg, Oral, Q4H PRN **OR** acetaminophen (TYLENOL) 160 MG/5ML oral solution 650 mg, 650 mg, Oral, Q4H PRN **OR** acetaminophen (TYLENOL) suppository 650 mg, 650 mg, Rectal, Q4H PRN, Kerley, Brian Joseph, DO    Calcium Replacement - Follow Nurse / BPA Driven Protocol, , Not Applicable, PRN, Kerley, Brian Joseph, DO    heparin 54968 units/250 mL (100 units/mL) in 0.45 % NaCl infusion, 18 Units/kg/hr, Intravenous, Titrated, Lilly Awad DO, Last Rate: 8.98 mL/hr at 12/29/24 1816, 18 Units/kg/hr at 12/29/24 1816    Magnesium Standard Dose Replacement - Follow Nurse / BPA Driven Protocol, , Not Applicable, PRN, Kerley, Brian Joseph, DO    nitroglycerin (NITROSTAT) SL tablet 0.4 mg, 0.4 mg, Sublingual, Q5 Min PRN, Kerley, Brian Joseph, DO    ondansetron (ZOFRAN) injection 4 mg, 4 mg, Intravenous, Q6H PRN,  "Kerley, Brian Joseph,     Pharmacy to Dose Heparin, , Not Applicable, Continuous PRN, Lilly Awad,     Phosphorus Replacement - Follow Nurse / BPA Driven Protocol, , Not Applicable, PRN, Kerley, Brian Joseph,     Potassium Replacement - Follow Nurse / BPA Driven Protocol, , Not Applicable, PRN, Kerley, Brian Joseph,     Sodium Chloride (PF) 0.9 % 10 mL, 10 mL, Intravenous, PRN, Kerley, Brian Joseph,     sodium chloride 0.9 % flush 10 mL, 10 mL, Intravenous, Q12H, Kerley, Brian Joseph, , 10 mL at 12/30/24 0927    sodium chloride 0.9 % flush 10 mL, 10 mL, Intravenous, PRN, Kerley, Brian Joseph,     sodium chloride 0.9 % infusion 40 mL, 40 mL, Intravenous, PRN, Kerley, Brian Joseph,     PHYSICAL EXAMINATION:   /72 (BP Location: Left arm, Patient Position: Lying)   Pulse 71   Temp 98 °F (36.7 °C) (Oral)   Resp 16   Ht 162.6 cm (64\")   Wt 49.9 kg (110 lb)   SpO2 100%   BMI 18.88 kg/m²                ECOG Performance Status: 2 - Symptomatic, <50% confined to bed  GENERAL: Age appropriate. No acute distress.   NEURO/PSYCH: A&O x 3, strength 5/5 in all muscle groups  HEENT: Head atraumatic, normocephalic.   NECK: Supple. No JVD. No lymphadenopathy.   LUNGS: Clear to auscultation bilaterally. No wheezing. No rhonchi.   HEART: Regular rate and rhythm. S1, S2, no murmurs.   ABDOMEN: Soft, nontender, nondistended. Bowel sounds positive. No  hepatosplenomegaly.   EXTREMITIES: No clubbing, cyanosis, or edema.   SKIN: No rashes. No purpura.       No results displayed because visit has over 200 results.          No results found.    ASSESSMENT: The patient is a very pleasant 79 y.o. female  with unexplained weight loss      PLAN:  1.  Unexplained weight loss:  A.  I explained to the patient that we will tumor markers came back negative.  B.  Her CA 19-9 was barely outside normal range and I do not think it is clinically relevant.  C.  Recommend to watch the pancreatic cyst for now.  No " previous images to compare with.  Will repeat her CT scan and CA 19-9 in 3 months which will be due late March 2025.  D.  Follow-up on bone scan result.    2.  Hypercalcemia:  A.  PTH more than 2000.  B.  Nuclear parathyroid scan is pending to confirm parathyroid adenoma.  C.  Recommend general surgery consult.  D.  Status post 1 dose of Zometa December 27, 2024.  E.  SPEP and serum free light chains are negative.    3.  Normocytic anemia:  A.  Normal vitamin levels and iron studies.  Negative myeloma workup.  B.  Got worse with hydration.  C.  Possibly induced by chronic kidney disease.  D.  Will repeat her CBC as an outpatient.    4.  Acute bilateral lower extremities DVTs, below the knee:  A.  Possibly induced by dehydration and immobility.  B.  Transition to Eliquis 5 mg twice a day without loading dose.  C.  Patient will be treated for at least 3 months possibly longer based on her rest of blood work results.    Okay to discharge from hematology perspective.  She will need to follow-up with me in 1 month with repeated CBC.  Emory Melo MD  12/30/2024

## 2024-12-30 NOTE — CASE MANAGEMENT/SOCIAL WORK
Discharge Planning Assessment  [facility]     Patient Name: Oralia Woodruff  MRN: [id number]  Today's Date: 12/30/2024    Admit Date: 12/27/2024    Plan: SNF   Discharge Needs Assessment       Row Name 12/30/24 1612       Living Environment    People in Home spouse    Current Living Arrangements home    Potentially Unsafe Housing Conditions none    Primary Care Provided by self    Provides Primary Care For no one    Family Caregiver if Needed spouse;child(janneth), adult    Quality of Family Relationships helpful;involved;supportive    Able to Return to Prior Arrangements yes       Resource/Environmental Concerns    Resource/Environmental Concerns none    Transportation Concerns none       Transition Planning    Patient/Family Anticipates Transition to home    Patient/Family Anticipated Services at Transition     Transportation Anticipated family or friend will provide       Discharge Needs Assessment    Readmission Within the Last 30 Days no previous admission in last 30 days    Equipment Currently Used at Home walker, rolling;rollator;other (see comments)  lift chair    Concerns to be Addressed discharge planning                   Discharge Plan       Row Name 12/30/24 1613       Plan    Plan SNF    Patient/Family in Agreement with Plan yes    Plan Comments CM spoke to patient and daughter at bedside. Patient lives at home with her spouse in [facility]. Prior to admission, she was independent with ADL's. Spouse assist with meds, cooking, and shopping. She has a rolling walker, rollator and lift chair. She is not current with home health and does not use home oxygen. She goes to a local clinic in [facility] for primary care services. Verified [employer] and [facility]. Therapy recommends IRF. Patient is agreeable and would like referrals sent to [facility] or [facility] (close to home) or lastly [facility]. Referrals were sent to [facility] today. PENDING at this time. CM will continue to follow.    Final Discharge Disposition Code 03 - skilled nursing facility (SNF)                  Continued Care and Services - Admitted Since 12/27/2024       Destination       Service Provider Request Status Services Address Phone Fax Patient Preferred    [facility] - SIGNATURE Pending - No Request Sent -- [address] [address] [phone] --    [facility] - SIGNATURE Pending - No Request Sent -- [address] [address] [phone] --                  Expected Discharge Date and Time       Expected Discharge Date Expected Discharge Time    Dec 31, 2024            Demographic Summary       Row Name 12/30/24 1611       General Information    Admission Type inpatient    Arrived From emergency department    Reason for Consult discharge planning    Preferred Language English                   Functional Status       Row Name 12/30/24 1611       Functional Status    Usual Activity Tolerance moderate    Current Activity Tolerance moderate       Functional Status, IADL    Medications assistive person    Meal Preparation assistive person    Housekeeping assistive person    Laundry assistive person    Shopping assistive person                   Psychosocial    No documentation.                  Abuse/Neglect    No documentation.                  Legal    No documentation.                  Substance Abuse    No documentation.                  Patient Forms    No documentation.                     Ashlie Blankenship, CYNTHIA

## 2024-12-31 ENCOUNTER — APPOINTMENT (OUTPATIENT)
Dept: ULTRASOUND IMAGING | Facility: HOSPITAL | Age: 79
End: 2024-12-31
Payer: MEDICARE

## 2024-12-31 LAB
ANION GAP SERPL CALCULATED.3IONS-SCNC: 10 MMOL/L (ref 5–15)
BASOPHILS # BLD AUTO: 0.04 10*3/MM3 (ref 0–0.2)
BASOPHILS NFR BLD AUTO: 0.7 % (ref 0–1.5)
BUN SERPL-MCNC: 30 MG/DL (ref 8–23)
BUN/CREAT SERPL: 20.7 (ref 7–25)
CALCIUM SPEC-SCNC: 8.3 MG/DL (ref 8.6–10.5)
CANCER AG27-29 SERPL-ACNC: 33.8 U/ML (ref 0–38.6)
CHLORIDE SERPL-SCNC: 110 MMOL/L (ref 98–107)
CO2 SERPL-SCNC: 19 MMOL/L (ref 22–29)
CREAT SERPL-MCNC: 1.45 MG/DL (ref 0.57–1)
DEPRECATED RDW RBC AUTO: 48 FL (ref 37–54)
EGFRCR SERPLBLD CKD-EPI 2021: 36.5 ML/MIN/1.73
EOSINOPHIL # BLD AUTO: 0.15 10*3/MM3 (ref 0–0.4)
EOSINOPHIL NFR BLD AUTO: 2.5 % (ref 0.3–6.2)
ERYTHROCYTE [DISTWIDTH] IN BLOOD BY AUTOMATED COUNT: 13.8 % (ref 12.3–15.4)
GLUCOSE SERPL-MCNC: 84 MG/DL (ref 65–99)
HCT VFR BLD AUTO: 31.8 % (ref 34–46.6)
HGB BLD-MCNC: 9.9 G/DL (ref 12–15.9)
IMM GRANULOCYTES # BLD AUTO: 0.07 10*3/MM3 (ref 0–0.05)
IMM GRANULOCYTES NFR BLD AUTO: 1.2 % (ref 0–0.5)
LYMPHOCYTES # BLD AUTO: 1.4 10*3/MM3 (ref 0.7–3.1)
LYMPHOCYTES NFR BLD AUTO: 23.5 % (ref 19.6–45.3)
MCH RBC QN AUTO: 29.6 PG (ref 26.6–33)
MCHC RBC AUTO-ENTMCNC: 31.1 G/DL (ref 31.5–35.7)
MCV RBC AUTO: 94.9 FL (ref 79–97)
MONOCYTES # BLD AUTO: 0.54 10*3/MM3 (ref 0.1–0.9)
MONOCYTES NFR BLD AUTO: 9.1 % (ref 5–12)
NEUTROPHILS NFR BLD AUTO: 3.75 10*3/MM3 (ref 1.7–7)
NEUTROPHILS NFR BLD AUTO: 63 % (ref 42.7–76)
NRBC BLD AUTO-RTO: 0 /100 WBC (ref 0–0.2)
PLATELET # BLD AUTO: 186 10*3/MM3 (ref 140–450)
PMV BLD AUTO: 10.7 FL (ref 6–12)
POTASSIUM SERPL-SCNC: 4.6 MMOL/L (ref 3.5–5.2)
RBC # BLD AUTO: 3.35 10*6/MM3 (ref 3.77–5.28)
SODIUM SERPL-SCNC: 139 MMOL/L (ref 136–145)
UFH PPP CHRO-ACNC: 0.23 IU/ML (ref 0.3–0.7)
UFH PPP CHRO-ACNC: 0.25 IU/ML (ref 0.3–0.7)
WBC NRBC COR # BLD AUTO: 5.95 10*3/MM3 (ref 3.4–10.8)

## 2024-12-31 PROCEDURE — 76536 US EXAM OF HEAD AND NECK: CPT

## 2024-12-31 PROCEDURE — 85025 COMPLETE CBC W/AUTO DIFF WBC: CPT | Performed by: STUDENT IN AN ORGANIZED HEALTH CARE EDUCATION/TRAINING PROGRAM

## 2024-12-31 PROCEDURE — 80048 BASIC METABOLIC PNL TOTAL CA: CPT | Performed by: STUDENT IN AN ORGANIZED HEALTH CARE EDUCATION/TRAINING PROGRAM

## 2024-12-31 PROCEDURE — 85520 HEPARIN ASSAY: CPT

## 2024-12-31 RX ORDER — BISACODYL 10 MG
10 SUPPOSITORY, RECTAL RECTAL DAILY PRN
Status: DISCONTINUED | OUTPATIENT
Start: 2024-12-31 | End: 2025-01-03 | Stop reason: HOSPADM

## 2024-12-31 RX ORDER — TRAMADOL HYDROCHLORIDE 50 MG/1
50 TABLET ORAL EVERY 6 HOURS PRN
Status: DISCONTINUED | OUTPATIENT
Start: 2024-12-31 | End: 2025-01-03 | Stop reason: HOSPADM

## 2024-12-31 RX ORDER — BISACODYL 5 MG/1
5 TABLET, DELAYED RELEASE ORAL DAILY PRN
Status: DISCONTINUED | OUTPATIENT
Start: 2024-12-31 | End: 2025-01-03 | Stop reason: HOSPADM

## 2024-12-31 RX ORDER — POLYETHYLENE GLYCOL 3350 17 G/17G
17 POWDER, FOR SOLUTION ORAL DAILY PRN
Status: DISCONTINUED | OUTPATIENT
Start: 2024-12-31 | End: 2025-01-03 | Stop reason: HOSPADM

## 2024-12-31 RX ORDER — AMOXICILLIN 250 MG
2 CAPSULE ORAL 2 TIMES DAILY
Status: DISCONTINUED | OUTPATIENT
Start: 2024-12-31 | End: 2025-01-03 | Stop reason: HOSPADM

## 2024-12-31 RX ADMIN — SENNOSIDES AND DOCUSATE SODIUM 2 TABLET: 50; 8.6 TABLET ORAL at 20:30

## 2024-12-31 RX ADMIN — TRAMADOL HYDROCHLORIDE 50 MG: 50 TABLET, COATED ORAL at 18:14

## 2024-12-31 RX ADMIN — Medication 10 ML: at 20:31

## 2024-12-31 NOTE — PLAN OF CARE
Goal Outcome Evaluation:        Problem: Adult Inpatient Plan of Care  Goal: Plan of Care Review  Outcome: Progressing  Goal: Patient-Specific Goal (Individualized)  Outcome: Progressing  Goal: Absence of Hospital-Acquired Illness or Injury  Outcome: Progressing  Goal: Optimal Comfort and Wellbeing  Outcome: Progressing  Goal: Readiness for Transition of Care  Outcome: Progressing     Problem: Skin Injury Risk Increased  Goal: Skin Health and Integrity  Outcome: Progressing     Problem: Fall Injury Risk  Goal: Absence of Fall and Fall-Related Injury  Outcome: Progressing

## 2024-12-31 NOTE — PROGRESS NOTES
Pharmacy to Dose Heparin Infusion Note    Oralia Woodruff is a 79 y.o. female receiving heparin infusion.     Therapy for (VTE/Cardiac): VTE  Patient Weight: 49.9  Initial Bolus (Y/N): No  Any Bolus (Y/N): Yes     Signs or Symptoms of Bleeding: No    VTE (PE/DVT)   Initial Bolus: 80 units/kg (Max 10,000 units)  Initial rate: 18 units/kg/hr (Max 1,500 units/hr)    Anti-Xa Bolus   Dose Infusion Hold   Time Infusion Rate Change (units/kg/hr) Repeat  Anti-Xa   < 0.11 50 units/kg  (4000 units Max) None Increase by  4 units/kg/hr 6 hours   0.11 - 0.19 25 units/kg  (2000 units Max) None Increase by  3 units/kg/hr 6 hours   0.2 - 0.29 0 None Increase by  2 units/kg/hr 6 hours   0.3 - 0.7 0 None No Change 6 hours (after 2 consecutive levels in range check qAM)   0.71 - 0.8 0 None Decrease by  1 units/kg/hr 6 hours   0.81 - 0.9 0 None Decrease by  2 units/kg/hr 6 hours   0.91 - 1 0 60 minutes Decrease by  3 units/kg/hr 6 hours   >1 0 Hold  After Anti-Xa less than 0.7 decrease previous rate by  4 units/kg/hr  Every 2 hours until Anti-Xa less than 0.7 then when infusion restarts in 6 hours     Results from last 7 days   Lab Units 12/31/24  0610 12/30/24  0335 12/29/24  1105 12/28/24  2144   INR   --   --   --  1.12   HEMOGLOBIN g/dL 9.9* 9.3* 10.2*  --    HEMATOCRIT % 31.8* 30.0* 31.8*  --    PLATELETS 10*3/mm3 186 183 195  --        Date   Time   Anti-Xa Current Rate (units/kg/hr) Bolus   (units) Rate Change   (units/kg/hr) New Rate (units/kg/hr) Repeat  Anti-Xa Comments /  Pump Check    12/28 1749 0.1 -- No initial +18 18 0000 DW RN   12/29 0044 0.25 18 -- -- 18 0800 Spoke with Ally;  Note almost therapeutic without initial bolus   12/29 1105 0.45 18 -- -- 18 1800 DW RN   12/29 1846 0.34 18 -- -- 18 0100 DW RN   12/30 0110 0.42 18 -- -- 18 1200 Jarvis -acb    12/30 1138 0.47 18 -- -- 18 0600 Dw RN   12/31 0610 0.23 18 -- +1 19 1600 Dw RN. Confirmed IV not off overnight nor delay in change of bag. No reason for drop, but  will increase and recheck                                                                                                                                                             Tee Templeton, [facility]  12/31/2024  09:42 EST

## 2024-12-31 NOTE — PROGRESS NOTES
[facility] Medicine Services  PROGRESS NOTE    Patient Name: Oralia Woodruff  : 1945  MRN: [id number]    Date of Admission: 2024  Primary Care Physician: Yanni Olsen MD    Subjective   Subjective     CC:  hypercalcemia    HPI:  Resting in bed no acute distress does not have any specific complaint.  No fever or chills.  No chest pain palpitation shortness of breath.  No nausea vomiting or diarrhea.      Objective   Objective     Vital Signs:   Temp:  [96.8 °F (36 °C)-98 °F (36.7 °C)] (P) 98 °F (36.7 °C)  Heart Rate:  [] 80  Resp:  [16-18] (P) 16  BP: (117-133)/(64-73) (P) 117/64     Physical Exam:  Constitutional: No acute distress, awake, alert  HENT: NCAT, mucous membranes moist  Respiratory: Clear to auscultation bilaterally, respiratory effort normal  GI: Abdomen is soft, not tender, not distended, bowel sounds are present  Musculoskeletal:   edema of lower extremities bilaterally.  Psychiatric: Appropriate affect, cooperative  Neurologic: Oriented x 3, no focality appreciated, speech clear  Skin: No rashes      Results Reviewed:  LAB RESULTS:      Lab 24  0610 24  1138 24  0335 24  0110 24  1846 24  1105 24  0044 24  2144 24  2140 24  0411 24  1839 24  1638 24  1517   WBC 5.95  --  5.61  --   --  4.28  --   --  5.62 4.97  --   --  6.27   HEMOGLOBIN 9.9*  --  9.3*  --   --  10.2*  --   --  9.8* 10.2*  --   --  11.9*   HEMATOCRIT 31.8*  --  30.0*  --   --  31.8*  --   --  30.8* 32.7*  --   --  37.5   PLATELETS 186  --  183  --   --  195  --   --  193 194  --   --  207   NEUTROS ABS 3.75  --  3.18  --   --  2.60  --   --  3.30 2.69  --   --  4.45   IMMATURE GRANS (ABS) 0.07*  --  0.01  --   --  0.01  --   --  0.01 0.01  --   --  0.01   LYMPHS ABS 1.40  --  1.79  --   --  1.23  --   --  1.74 1.68  --   --  1.37   MONOS ABS 0.54  --  0.48  --   --  0.33  --   --  0.42 0.46  --    --  0.38   EOS ABS 0.15  --  0.12  --   --  0.09  --   --  0.12 0.10  --   --  0.03   MCV 94.9  --  95.2  --   --  94.4  --   --  94.2 96.2  --   --  94.7   LACTATE  --   --   --   --   --   --   --   --   --   --   --   --  0.9   PROTIME  --   --   --   --   --   --   --  14.5  --   --   --   --   --    APTT  --   --   --   --   --   --   --  49.2*  --   --   --   --   --    HEPARIN ANTI-XA 0.23* 0.47  --  0.42 0.34 0.45   < > 0.11*  --   --   --   --   --    HSTROP T  --   --   --   --   --   --   --   --   --   --  17* 16*  --     < > = values in this interval not displayed.         Lab 12/31/24  0609 12/30/24  0335 12/29/24  1105 12/28/24  0411 12/27/24  1642 12/27/24  1638   SODIUM 139 139 140 138  --  135*   POTASSIUM 4.6 4.1 4.6 4.1  --  4.7   CHLORIDE 110* 109* 110* 108*  --  105   CO2 19.0* 21.0* 20.0* 19.0*  --  20.0*   ANION GAP 10.0 9.0 10.0 11.0  --  10.0   BUN 30* 34* 30* 36*  --  39*   CREATININE 1.45* 1.55* 1.70* 1.51* 1.70* 1.60*   EGFR 36.5* 33.7* 30.2* 34.8*  --  32.5*   GLUCOSE 84 96 97 87  --  94   CALCIUM 8.3* 9.3 11.2* 12.1*  --  12.6*   IONIZED CALCIUM  --   --  1.49*  --   --   --          Lab 12/29/24  1105 12/28/24  2144 12/27/24  1638   TOTAL PROTEIN 5.5*  --  6.7   ALBUMIN 3.4* 3.2 3.9   GLOBULIN 2.1  --  2.8   ALT (SGPT) 9  --  10   AST (SGOT) 18  --  20   BILIRUBIN <0.2  --  0.3   ALK PHOS 388*  --  440*   LIPASE  --   --  86*         Lab 12/28/24  2144 12/27/24  1839 12/27/24  1638   HSTROP T  --  17* 16*   PROTIME 14.5  --   --    INR 1.12  --   --              Lab 12/29/24  1105   IRON 71   IRON SATURATION (TSAT) 29   TIBC 246*   TRANSFERRIN 165*   FOLATE 14.50   VITAMIN B 12 750         Brief Urine Lab Results  (Last result in the past 365 days)        Color   Clarity   Blood   Leuk Est   Nitrite   Protein   CREAT   Urine HCG        12/27/24 1518 Yellow   Clear   Negative   Negative   Negative   30 mg/dL (1+)                   Microbiology Results Abnormal       None            US  Thyroid    Result Date: 12/31/2024  US THYROID Date of Exam: 12/31/2024 3:54 PM EST Indication: very elevated parathyroid hormone, elevated serum calcium.. Comparison: No comparisons available. Technique: Grayscale and color and Doppler ultrasound imaging of the thyroid performed according to routine thyroid ultrasound protocol, real time with image documentation. Findings: Thyroid appears lobular and enlarged overall. The right thyroid lobe measures 4.2 x 2.1 x 2.1 cm. Left lobe measures 4.8 x 2.6 x 2.3 cm. Thyroid isthmus measures 1.2 cm in width. Echotexture of the thyroid is generally heterogeneous and irregularly nodular. There is a single, approximately 0.5 x 0.5 x 0.8 cm well-defined isoechoic nodule of the right thyroid upper-mid pole with surrounding complete hypoechoic halo, typical for benign thyroid nodule. No other intra parenchymal thyroid lesions are seen. Along the dorsal lower mid pole region of the right thyroid lobe, there is a sharply defined, markedly hypoechoic nodule questionably partially cystic, generally with reniform shape, 2.4 x 1.2 x 1.4 cm in diameter with a small amount of internal color Doppler flow. Appearance is fairly typical for a parathyroid adenoma. No other nodule or cyst is seen at the level of the scan.     Impression: Impression: 1. Large heterogeneous thyroid gland, consistent with goiter. 2. Single 0.5 x 0.5 x 8 mm isoechoic thyroid nodule, TI RADS category 3, with no need for follow-up by TI-RADS criteria. 3. Sharply-defined, markedly hypoechoic, but mostly solid appearing nodule along the dorsal mid and inferior portion of the right thyroid lobe, features considered typical of parathyroid adenoma. TI-RADS: TR3 - Size less than 1.5 cm. No follow up needed. TI RADS recommendations pertain to the intrinsic thyroid nodule only. Electronically Signed: Jeet Simon MD  12/31/2024 4:44 PM EST  Workstation ID: [id number]    NM Bone Scan Whole Body    Result Date: 12/30/2024  DATE OF  EXAM: 12/30/2024 10:47 AM EST PROCEDURE: NM BONE SCAN WHOLE BODY INDICATIONS: Back pain with hypercalcemia COMPARISON: CT CAP 12/27/2024 TECHNIQUE: The patient received 29.5 mCi of technetium 99m MDP intravenously and 3 hour delayed anterior and posterior whole body bone images were obtained. FINDINGS: Diffuse increased uptake throughout the skeleton is consistent with so-called SuperScan pattern. No renal activity is evident. The differential diagnosis includes metabolic bone disease, renal osteodystrophy, hyperparathyroidism, hyperthyroidism, and diffuse neoplastic marrow involvement. Uptake in the maxilla and mandible is probably odontogenic. Uptake in posterior upper right ribs is consistent with healing fracture. Uptake in the shoulders, elbows, knees, ankles, and feet is consistent with degenerative change. Multifocal uptake in the spine is consistent with degenerative change.     Impression: Total body bone scan demonstrating diffuse increased skeletal uptake, as above. Multifocal uptake in the axial and appendicular skeleton is consistent with degenerative change. Electronically Signed: Agustín Tafoya MD  12/30/2024 5:44 PM EST  Workstation ID: [id number]     Results for orders placed during the hospital encounter of 12/27/24    Adult Transthoracic Echo Complete W/ Cont if Necessary Per Protocol    Interpretation Summary    Left ventricular systolic function is normal. Calculated left ventricular EF = 59.2% Left ventricular ejection fraction appears to be 56 - 60%.    Left ventricular diastolic function was normal.      Current medications:  Scheduled Meds:senna-docusate sodium, 2 tablet, Oral, BID  sodium chloride, 10 mL, Intravenous, Q12H      Continuous Infusions:heparin, 19 Units/kg/hr, Last Rate: 19 Units/kg/hr (12/31/24 1014)  Pharmacy to Dose Heparin,       PRN Meds:.  acetaminophen **OR** acetaminophen **OR** acetaminophen    senna-docusate sodium **AND** polyethylene glycol **AND** bisacodyl **AND**  bisacodyl    Calcium Replacement - Follow Nurse / BPA Driven Protocol    Magnesium Standard Dose Replacement - Follow Nurse / BPA Driven Protocol    nitroglycerin    ondansetron    Pharmacy to Dose Heparin    Phosphorus Replacement - Follow Nurse / BPA Driven Protocol    Potassium Replacement - Follow Nurse / BPA Driven Protocol    Sodium Chloride (PF)    sodium chloride    sodium chloride    traMADol    Assessment & Plan   Assessment & Plan     Active Hospital Problems    Diagnosis  POA    **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        Brief Hospital Course to date:  Oralia Woodruff is a 79 y.o. female with history of hypertension admitted for failure to thrive and significant weight loss.  She was brought in by her daughter who is visiting over the holiday.  CT abdomen/pelvis concerning for a cystic lesion of the pancreas neoplasm. Workup in progress. Oncology following     Abdominal pain  Weight loss  Pancreatic cystic lesion  -Concern for malignancy, oncology consulted  -CA 19-9 elevated but very mildly  -Bone scan shows diffuse uptake very compatible with elevated parathyroid hormone     Mild hypercalcemia  -Improved with IVF  -S/p Zometa times 1 on 12/28  -Thyroid ultrasound was done on which detected a nodule very compatible with parathyroid adenoma.  Will ask surgery to evaluate.     RLE pain  R LE and LLE DVT  -Acute right lower extremity deep vein thrombosis noted in the popliteal and peroneal.  - Acute left lower extremity deep vein thrombosis noted in the soleal  -heparin drip started 12/28     Renal insufficiency  Unsure of baseline CKD     Anemia, normocytic     Debility, weakness  -PT/OT          Expected Discharge Location and Transportation: [facility]  Expected Discharge   Expected Discharge Date: 12/31/2024; Expected Discharge Time:      VTE Prophylaxis:  Pharmacologic VTE prophylaxis orders are present.         AM-PAC 6 Clicks Score (PT): (P)  19 (12/31/24 0800)    CODE STATUS:   Code Status and Medical Interventions: CPR (Attempt to Resuscitate); Full Support   Ordered at: 12/27/24 8851     Code Status (Patient has no pulse and is not breathing):    CPR (Attempt to Resuscitate)     Medical Interventions (Patient has pulse or is breathing):    Full Support       Kyle Chowdhury MD  12/31/24

## 2025-01-01 PROBLEM — E44.0 MODERATE PROTEIN-CALORIE MALNUTRITION: Status: ACTIVE | Noted: 2025-01-01

## 2025-01-01 LAB
ANION GAP SERPL CALCULATED.3IONS-SCNC: 11 MMOL/L (ref 5–15)
BUN SERPL-MCNC: 24 MG/DL (ref 8–23)
BUN/CREAT SERPL: 17.8 (ref 7–25)
CA-I SERPL ISE-MCNC: 1.12 MMOL/L (ref 1.15–1.3)
CALCIUM SPEC-SCNC: 7.8 MG/DL (ref 8.6–10.5)
CHLORIDE SERPL-SCNC: 109 MMOL/L (ref 98–107)
CO2 SERPL-SCNC: 18 MMOL/L (ref 22–29)
CREAT SERPL-MCNC: 1.35 MG/DL (ref 0.57–1)
EGFRCR SERPLBLD CKD-EPI 2021: 39.8 ML/MIN/1.73
GLUCOSE SERPL-MCNC: 110 MG/DL (ref 65–99)
MAGNESIUM SERPL-MCNC: 2.2 MG/DL (ref 1.6–2.4)
PHOSPHATE SERPL-MCNC: 1.6 MG/DL (ref 2.5–4.5)
PHOSPHATE SERPL-MCNC: 5.2 MG/DL (ref 2.5–4.5)
POTASSIUM SERPL-SCNC: 4.5 MMOL/L (ref 3.5–5.2)
SODIUM SERPL-SCNC: 138 MMOL/L (ref 136–145)
UFH PPP CHRO-ACNC: 0.38 IU/ML (ref 0.3–0.7)
UFH PPP CHRO-ACNC: 0.41 IU/ML (ref 0.3–0.7)

## 2025-01-01 PROCEDURE — 80048 BASIC METABOLIC PNL TOTAL CA: CPT | Performed by: INTERNAL MEDICINE

## 2025-01-01 PROCEDURE — 84100 ASSAY OF PHOSPHORUS: CPT | Performed by: INTERNAL MEDICINE

## 2025-01-01 PROCEDURE — 97530 THERAPEUTIC ACTIVITIES: CPT

## 2025-01-01 PROCEDURE — 83735 ASSAY OF MAGNESIUM: CPT | Performed by: INTERNAL MEDICINE

## 2025-01-01 PROCEDURE — 25810000003 SODIUM CHLORIDE 0.9 % SOLUTION 250 ML FLEX CONT: Performed by: INTERNAL MEDICINE

## 2025-01-01 PROCEDURE — 82330 ASSAY OF CALCIUM: CPT | Performed by: INTERNAL MEDICINE

## 2025-01-01 PROCEDURE — 25010000002 HEPARIN (PORCINE) 25000-0.45 UT/250ML-% SOLUTION

## 2025-01-01 PROCEDURE — 85520 HEPARIN ASSAY: CPT

## 2025-01-01 RX ADMIN — Medication 10 ML: at 20:19

## 2025-01-01 RX ADMIN — SODIUM CHLORIDE 15 MMOL: 9 INJECTION, SOLUTION INTRAVENOUS at 20:18

## 2025-01-01 RX ADMIN — SENNOSIDES AND DOCUSATE SODIUM 2 TABLET: 50; 8.6 TABLET ORAL at 09:07

## 2025-01-01 RX ADMIN — SODIUM PHOSPHATE, MONOBASIC, MONOHYDRATE AND SODIUM PHOSPHATE, DIBASIC, ANHYDROUS 15 MMOL: 142; 276 INJECTION, SOLUTION INTRAVENOUS at 10:34

## 2025-01-01 RX ADMIN — SODIUM CHLORIDE 15 MMOL: 9 INJECTION, SOLUTION INTRAVENOUS at 16:07

## 2025-01-01 RX ADMIN — HEPARIN SODIUM 21 UNITS/KG/HR: 10000 INJECTION, SOLUTION INTRAVENOUS at 02:21

## 2025-01-01 RX ADMIN — BISACODYL 5 MG: 5 TABLET, COATED ORAL at 09:10

## 2025-01-01 RX ADMIN — Medication 10 ML: at 09:11

## 2025-01-01 NOTE — PROGRESS NOTES
[facility] Medicine Services  PROGRESS NOTE    Patient Name: Oralia Woodruff  : 1945  MRN: [id number]    Date of Admission: 2024  Primary Care Physician: Yanni Olsen MD    Subjective   Subjective     CC:  hypercalcemia    HPI:  Resting in bed no acute distress does not have any specific complaint.  No fever or chills.  No chest pain palpitation shortness of breath.  No nausea vomiting or diarrhea.      Objective   Objective     Vital Signs:   Temp:  [97.6 °F (36.4 °C)-98.7 °F (37.1 °C)] 98.4 °F (36.9 °C)  Heart Rate:  [] 71  Resp:  [16-18] 18  BP: (126-166)/(68-85) 127/68     Physical Exam:  Constitutional: No acute distress, awake, alert  HENT: NCAT, mucous membranes moist  Respiratory: Clear to auscultation bilaterally, respiratory effort normal  GI: Abdomen is soft, not tender, not distended, bowel sounds are present  Musculoskeletal:   edema of lower extremities bilaterally.  Psychiatric: Appropriate affect, cooperative  Neurologic: Oriented x 3, no focality appreciated, speech clear  Skin: No rashes      Results Reviewed:  LAB RESULTS:      Lab 25  0555 24  2340 24  1637 24  0610 24  1138 24  0335 24  1846 24  1105 24  0044 24  2144 24  2140 24  0411 24  1839 24  1638 24  1517   WBC  --   --   --  5.95  --  5.61  --  4.28  --   --  5.62 4.97  --   --  6.27   HEMOGLOBIN  --   --   --  9.9*  --  9.3*  --  10.2*  --   --  9.8* 10.2*  --   --  11.9*   HEMATOCRIT  --   --   --  31.8*  --  30.0*  --  31.8*  --   --  30.8* 32.7*  --   --  37.5   PLATELETS  --   --   --  186  --  183  --  195  --   --  193 194  --   --  207   NEUTROS ABS  --   --   --  3.75  --  3.18  --  2.60  --   --  3.30 2.69  --   --  4.45   IMMATURE GRANS (ABS)  --   --   --  0.07*  --  0.01  --  0.01  --   --  0.01 0.01  --   --  0.01   LYMPHS ABS  --   --   --  1.40  --  1.79  --  1.23  --   --   1.74 1.68  --   --  1.37   MONOS ABS  --   --   --  0.54  --  0.48  --  0.33  --   --  0.42 0.46  --   --  0.38   EOS ABS  --   --   --  0.15  --  0.12  --  0.09  --   --  0.12 0.10  --   --  0.03   MCV  --   --   --  94.9  --  95.2  --  94.4  --   --  94.2 96.2  --   --  94.7   LACTATE  --   --   --   --   --   --   --   --   --   --   --   --   --   --  0.9   PROTIME  --   --   --   --   --   --   --   --   --  14.5  --   --   --   --   --    APTT  --   --   --   --   --   --   --   --   --  49.2*  --   --   --   --   --    HEPARIN ANTI-XA 0.38 0.41 0.25* 0.23* 0.47  --    < > 0.45   < > 0.11*  --   --   --   --   --    HSTROP T  --   --   --   --   --   --   --   --   --   --   --   --  17* 16*  --     < > = values in this interval not displayed.         Lab 01/01/25  0555 12/31/24  0609 12/30/24  0335 12/29/24  1105 12/28/24  0411   SODIUM 138 139 139 140 138   POTASSIUM 4.5 4.6 4.1 4.6 4.1   CHLORIDE 109* 110* 109* 110* 108*   CO2 18.0* 19.0* 21.0* 20.0* 19.0*   ANION GAP 11.0 10.0 9.0 10.0 11.0   BUN 24* 30* 34* 30* 36*   CREATININE 1.35* 1.45* 1.55* 1.70* 1.51*   EGFR 39.8* 36.5* 33.7* 30.2* 34.8*   GLUCOSE 110* 84 96 97 87   CALCIUM 7.8* 8.3* 9.3 11.2* 12.1*   IONIZED CALCIUM 1.12*  --   --  1.49*  --    MAGNESIUM 2.2  --   --   --   --    PHOSPHORUS 1.6*  --   --   --   --          Lab 12/29/24  1105 12/28/24 2144 12/27/24  1638   TOTAL PROTEIN 5.5*  --  6.7   ALBUMIN 3.4* 3.2 3.9   GLOBULIN 2.1  --  2.8   ALT (SGPT) 9  --  10   AST (SGOT) 18  --  20   BILIRUBIN <0.2  --  0.3   ALK PHOS 388*  --  440*   LIPASE  --   --  86*         Lab 12/28/24 2144 12/27/24  1839 12/27/24  1638   HSTROP T  --  17* 16*   PROTIME 14.5  --   --    INR 1.12  --   --              Lab 12/29/24  1105   IRON 71   IRON SATURATION (TSAT) 29   TIBC 246*   TRANSFERRIN 165*   FOLATE 14.50   VITAMIN B 12 750         Brief Urine Lab Results  (Last result in the past 365 days)        Color   Clarity   Blood   Leuk Est   Nitrite    Protein   CREAT   Urine HCG        12/27/24 1518 Yellow   Clear   Negative   Negative   Negative   30 mg/dL (1+)                   Microbiology Results Abnormal       None            US Thyroid    Result Date: 12/31/2024  US THYROID Date of Exam: 12/31/2024 3:54 PM EST Indication: very elevated parathyroid hormone, elevated serum calcium.. Comparison: No comparisons available. Technique: Grayscale and color and Doppler ultrasound imaging of the thyroid performed according to routine thyroid ultrasound protocol, real time with image documentation. Findings: Thyroid appears lobular and enlarged overall. The right thyroid lobe measures 4.2 x 2.1 x 2.1 cm. Left lobe measures 4.8 x 2.6 x 2.3 cm. Thyroid isthmus measures 1.2 cm in width. Echotexture of the thyroid is generally heterogeneous and irregularly nodular. There is a single, approximately 0.5 x 0.5 x 0.8 cm well-defined isoechoic nodule of the right thyroid upper-mid pole with surrounding complete hypoechoic halo, typical for benign thyroid nodule. No other intra parenchymal thyroid lesions are seen. Along the dorsal lower mid pole region of the right thyroid lobe, there is a sharply defined, markedly hypoechoic nodule questionably partially cystic, generally with reniform shape, 2.4 x 1.2 x 1.4 cm in diameter with a small amount of internal color Doppler flow. Appearance is fairly typical for a parathyroid adenoma. No other nodule or cyst is seen at the level of the scan.     Impression: Impression: 1. Large heterogeneous thyroid gland, consistent with goiter. 2. Single 0.5 x 0.5 x 8 mm isoechoic thyroid nodule, TI RADS category 3, with no need for follow-up by TI-RADS criteria. 3. Sharply-defined, markedly hypoechoic, but mostly solid appearing nodule along the dorsal mid and inferior portion of the right thyroid lobe, features considered typical of parathyroid adenoma. TI-RADS: TR3 - Size less than 1.5 cm. No follow up needed. TI RADS recommendations pertain  to the intrinsic thyroid nodule only. Electronically Signed: Jeet Simon MD  12/31/2024 4:44 PM EST  Workstation ID: [id number]     Results for orders placed during the hospital encounter of 12/27/24    Adult Transthoracic Echo Complete W/ Cont if Necessary Per Protocol    Interpretation Summary    Left ventricular systolic function is normal. Calculated left ventricular EF = 59.2% Left ventricular ejection fraction appears to be 56 - 60%.    Left ventricular diastolic function was normal.      Current medications:  Scheduled Meds:senna-docusate sodium, 2 tablet, Oral, BID  sodium chloride, 10 mL, Intravenous, Q12H  sodium phosphate, 15 mmol, Intravenous, Q3H      Continuous Infusions:heparin, 21 Units/kg/hr, Last Rate: 21 Units/kg/hr (01/01/25 0906)  Pharmacy to Dose Heparin,       PRN Meds:.  acetaminophen **OR** acetaminophen **OR** acetaminophen    senna-docusate sodium **AND** polyethylene glycol **AND** bisacodyl **AND** bisacodyl    Calcium Replacement - Follow Nurse / BPA Driven Protocol    Magnesium Standard Dose Replacement - Follow Nurse / BPA Driven Protocol    nitroglycerin    ondansetron    Pharmacy to Dose Heparin    Phosphorus Replacement - Follow Nurse / BPA Driven Protocol    Potassium Replacement - Follow Nurse / BPA Driven Protocol    Sodium Chloride (PF)    sodium chloride    sodium chloride    traMADol    Assessment & Plan   Assessment & Plan     Active Hospital Problems    Diagnosis  POA    **Pancreatic mass [K86.89]  Yes    Moderate protein-calorie malnutrition [E44.0]  Yes      Resolved Hospital Problems   No resolved problems to display.        Brief Hospital Course to date:  Oralia Woodruff is a 79 y.o. female with history of hypertension admitted for failure to thrive and significant weight loss.  She was brought in by her daughter who is visiting over the holiday.  CT abdomen/pelvis concerning for a cystic lesion of the pancreas neoplasm. Workup in progress. Oncology following      Abdominal pain  Weight loss  Pancreatic cystic lesion  -Concern for malignancy, oncology consulted  -CA 19-9 elevated but very mildly  -Bone scan shows diffuse uptake very compatible with elevated parathyroid hormone     Mild hypercalcemia  Very elevated parathyroid hormone  -Hypercalcemia improved with IVF  -S/p Zometa times 1 on 12/28  -Thyroid ultrasound was done which showed a nodule very compatible with parathyroid adenoma.  Will ask surgery to evaluate.     RLE pain  R LE and LLE DVT  -Acute right lower extremity deep vein thrombosis noted in the popliteal and peroneal.  - Acute left lower extremity deep vein thrombosis noted in the soleal  -heparin drip started 12/28     Renal insufficiency  Unsure of baseline CKD     Anemia, normocytic     Debility, weakness  -PT/OT          Expected Discharge Location and Transportation: [facility]  Expected Discharge   Expected Discharge Date: 12/31/2024; Expected Discharge Time:      VTE Prophylaxis:  Pharmacologic VTE prophylaxis orders are present.         AM-PAC 6 Clicks Score (PT): 19 (01/01/25 0815)    CODE STATUS:   Code Status and Medical Interventions: CPR (Attempt to Resuscitate); Full Support   Ordered at: 12/27/24 6345     Code Status (Patient has no pulse and is not breathing):    CPR (Attempt to Resuscitate)     Medical Interventions (Patient has pulse or is breathing):    Full Support       Kyle Chowdhury MD  01/01/25

## 2025-01-01 NOTE — PROGRESS NOTES
Pharmacy to Dose Heparin Infusion Note    Oralia Woodruff is a 79 y.o. female receiving heparin infusion.     Therapy for (VTE/Cardiac): VTE  Patient Weight: 49.9  Initial Bolus (Y/N): No  Any Bolus (Y/N): Yes     Signs or Symptoms of Bleeding: No    VTE (PE/DVT)   Initial Bolus: 80 units/kg (Max 10,000 units)  Initial rate: 18 units/kg/hr (Max 1,500 units/hr)    Anti-Xa Bolus   Dose Infusion Hold   Time Infusion Rate Change (units/kg/hr) Repeat  Anti-Xa   < 0.11 50 units/kg  (4000 units Max) None Increase by  4 units/kg/hr 6 hours   0.11 - 0.19 25 units/kg  (2000 units Max) None Increase by  3 units/kg/hr 6 hours   0.2 - 0.29 0 None Increase by  2 units/kg/hr 6 hours   0.3 - 0.7 0 None No Change 6 hours (after 2 consecutive levels in range check qAM)   0.71 - 0.8 0 None Decrease by  1 units/kg/hr 6 hours   0.81 - 0.9 0 None Decrease by  2 units/kg/hr 6 hours   0.91 - 1 0 60 minutes Decrease by  3 units/kg/hr 6 hours   >1 0 Hold  After Anti-Xa less than 0.7 decrease previous rate by  4 units/kg/hr  Every 2 hours until Anti-Xa less than 0.7 then when infusion restarts in 6 hours     Results from last 7 days   Lab Units 12/31/24  0610 12/30/24  0335 12/29/24  1105 12/28/24  2144   INR   --   --   --  1.12   HEMOGLOBIN g/dL 9.9* 9.3* 10.2*  --    HEMATOCRIT % 31.8* 30.0* 31.8*  --    PLATELETS 10*3/mm3 186 183 195  --        Date   Time   Anti-Xa Current Rate (units/kg/hr) Bolus   (units) Rate Change   (units/kg/hr) New Rate (units/kg/hr) Repeat  Anti-Xa Comments /  Pump Check    12/28 1749 0.1 -- No initial +18 18 0000 DW RN   12/29 0044 0.25 18 -- -- 18 0800 Spoke with Ally;  Note almost therapeutic without initial bolus   12/29 1105 0.45 18 -- -- 18 1800 DW RN   12/29 1846 0.34 18 -- -- 18 0100 DW RN   12/30 0110 0.42 18 -- -- 18 1200 Jarvis -acb    12/30 1138 0.47 18 -- -- 18 0600 Dw RN   12/31 0610 0.23 18 -- +1 19 1600 Dw RN. Confirmed IV not off overnight nor delay in change of bag. No reason for drop, but  will increase and recheck   12/31 1637 0.25 19 -- +2 21 0000 D/w RN ([place])   12/31 23:40 0.41 21 -- -- 21 0600 1/1 0555 0.38 21 -- -- 21 0600 CYNTHIA Singh, [facility]  1/1/2025  07:49 EST

## 2025-01-01 NOTE — PLAN OF CARE
Goal Outcome Evaluation:           Problem: Adult Inpatient Plan of Care  Goal: Plan of Care Review  Outcome: Progressing  Goal: Patient-Specific Goal (Individualized)  Outcome: Progressing  Goal: Absence of Hospital-Acquired Illness or Injury  Outcome: Progressing  Intervention: Identify and Manage Fall Risk  Recent Flowsheet Documentation  Taken 1/1/2025 1200 by Sydney Guido RN  Safety Promotion/Fall Prevention:   activity supervised   assistive device/personal items within reach   clutter free environment maintained   fall prevention program maintained   nonskid shoes/slippers when out of bed   room organization consistent   safety round/check completed   toileting scheduled  Taken 1/1/2025 0800 by Sydney Guido RN  Safety Promotion/Fall Prevention:   activity supervised   assistive device/personal items within reach   clutter free environment maintained   fall prevention program maintained   nonskid shoes/slippers when out of bed   room organization consistent   safety round/check completed   toileting scheduled  Intervention: Prevent Skin Injury  Recent Flowsheet Documentation  Taken 1/1/2025 1200 by Sydney Guido RN  Body Position: position changed independently  Skin Protection:   incontinence pads utilized   silicone foam dressing in place   skin sealant/moisture barrier applied   transparent dressing maintained  Taken 1/1/2025 0800 by Sydney Guido RN  Body Position: position changed independently  Skin Protection:   incontinence pads utilized   silicone foam dressing in place   skin sealant/moisture barrier applied   transparent dressing maintained   pulse oximeter probe site changed  Intervention: Prevent and Manage VTE (Venous Thromboembolism) Risk  Recent Flowsheet Documentation  Taken 1/1/2025 0800 by Sydney Guido RN  VTE Prevention/Management: (hep gtt) other (see comments)  Goal: Optimal Comfort and Wellbeing  Outcome: Progressing  Intervention: Monitor Pain and Promote  Comfort  Recent Flowsheet Documentation  Taken 1/1/2025 1200 by Sydney Guido, RN  Pain Management Interventions: care clustered  Taken 1/1/2025 0800 by Sydney Guido RN  Pain Management Interventions:   care clustered   pillow support provided  Intervention: Provide Person-Centered Care  Recent Flowsheet Documentation  Taken 1/1/2025 1200 by Sydney Guido, RN  Trust Relationship/Rapport:   care explained   choices provided  Taken 1/1/2025 0800 by Sydney Guido RN  Trust Relationship/Rapport:   care explained   choices provided  Goal: Readiness for Transition of Care  Outcome: Progressing

## 2025-01-01 NOTE — PROGRESS NOTES
"          Clinical Nutrition Assessment     Patient Name: Oralia Woodruff  YOB: 1945  MRN: [id number]  Date of Encounter: 12/31/24 20:36 EST  Admission date: 12/27/2024  Reason for Visit: Identified at risk by screening criteria, MST score 2+, Malnutrition Severity Assessment    Assessment   Nutrition Assessment   Admission Diagnosis:  Pancreatic mass [K86.89]    Problem List:    Pancreatic mass      PMH:   She  has a past medical history of Hypertension.    PSH:  She  has a past surgical history that includes Cholecystectomy; Hysterectomy; and Eye surgery.    Applicable Nutrition History:       Anthropometrics     Height: Height: 162.6 cm (64\")  Last Filed Weight: Weight: 49.9 kg (110 lb) (12/27/24 1211)  Method:  pt confirms   BMI: BMI (Calculated): 18.9    UBW:  Per EMR wt of 143 lbs on 1/24/23   Weight change: Loss of 33 lbs 23% body wt over 2 years. Pt allows has been steady/gradual despite good intake.     Nutrition Focused Physical Exam    Date: 12/31    Patient meets criteria for malnutrition diagnosis, see MSA note.     Subjective   Reported/Observed/Food/Nutrition Related History:     12/31  Pt allows gradual wt loss over 2 yrs and more. Allows tries to eat as much as able. Wanting to keep focus on food vs suppl.     Current Nutrition Prescription   PO: Diet: Regular/House; Fluid Consistency: Thin (IDDSI 0)  Oral Nutrition Supplement:   Intake: 3 Days: 75% x 7 meals recorded    Assessment & Plan   Nutrition Diagnosis   Date:  12/31            Updated:    Problem Malnutrition  moderate chronic   Etiology Likely nutrition demands of condition- including pancreatic mass  w gradual wt loss   Signs/Symptoms Wasting   Status: New    Goal / Objectives:   Nutrition to support treatment and Maintain intake at least at current level      Nutrition Intervention      Follow treatment progress, Care plan reviewed, Advise alternate selection, Menu provided, Encourage intake    Follow for continued " intake.    Monitoring/Evaluation:   Per protocol, I&O, PO intake, Pertinent labs, Weight, Symptoms    Liane Meier RD  Time Spent: 30 min

## 2025-01-01 NOTE — PLAN OF CARE
Goal Outcome Evaluation:  Plan of Care Reviewed With: patient        Progress: improving  Outcome Evaluation: Improving mobility. Pt is able to mobilize with less assistance and increased her walking distance. She is declining HHPT and plans to go home with her spouse.    Anticipated Discharge Disposition (PT): home

## 2025-01-01 NOTE — THERAPY TREATMENT NOTE
Patient Name: Oralia Woodruff  : 1945    MRN: [id number]                              Today's Date: 2025       Admit Date: 2024    Visit Dx:     ICD-10-CM ICD-9-CM   1. Pancreatic mass  K86.89 577.8   2. Acute abdominal pain  R10.9 789.00     338.19   3. Weight loss  R63.4 783.21     Patient Active Problem List   Diagnosis    Pancreatic mass    Moderate protein-calorie malnutrition     Past Medical History:   Diagnosis Date    Hypertension      Past Surgical History:   Procedure Laterality Date    CHOLECYSTECTOMY      EYE SURGERY      HYSTERECTOMY        General Information       Row Name 2524          Physical Therapy Time and Intention    Document Type therapy note (daily note)  -LS     Mode of Treatment physical therapy  -LS       Row Name 25          General Information    Patient Profile Reviewed yes  -LS     Existing Precautions/Restrictions fall  -LS       Row Name 25          Cognition    Orientation Status (Cognition) oriented x 4  -LS               User Key  (r) = Recorded By, (t) = Taken By, (c) = Cosigned By      Initials Name Provider Type    LS Mai Toney, PT Physical Therapist                   Mobility       Row Name 2524          Bed Mobility    Bed Mobility supine-sit  -LS     Supine-Sit [place] (Bed Mobility) independent  -LS     Sit-Supine [place] (Bed Mobility) minimum assist (75% patient effort)  -LS     Assistive Device (Bed Mobility) head of bed elevated  -LS     Comment, (Bed Mobility) vcs for sequencing sit to supine. pt needed assist to bring RLE onto bed. Leg  issued. pt was able to use this to move leg in bed. she was not able to use it to bring RLE onto bed from sitting and was not able to use LLE to assist RLE onto bed d/t pain R knee with movement.  -LS       Row Name 25          Sit-Stand Transfer    Sit-Stand [place] (Transfers) standby assist  -LS     Assistive Device (Sit-Stand  Transfers) walker, front-wheeled  -LS       Row Name 01/01/25 0824          Gait/Stairs (Locomotion)    [place] (Gait) standby assist  -LS     Assistive Device (Gait) walker, front-wheeled  -LS     Patient was able to Ambulate yes  -LS     Distance in Feet (Gait) 280  -LS     Deviations/Abnormal Patterns (Gait) gait speed decreased  -LS               User Key  (r) = Recorded By, (t) = Taken By, (c) = Cosigned By      Initials Name Provider Type    Mai Courtney PT Physical Therapist                   Obj/Interventions       Row Name 01/01/25 0824          Motor Skills    Therapeutic Exercise knee;ankle  -       Row Name 01/01/25 0824          Knee (Therapeutic Exercise)    Knee (Therapeutic Exercise) isometric exercises  -     Knee Isometrics (Therapeutic Exercise) quad sets;5 repetitions  attempted R SLR; however, pt has pain with attempts to do SLR. this was discontinued.  -       Row Name 01/01/25 0824          Ankle (Therapeutic Exercise)    Ankle (Therapeutic Exercise) AROM (active range of motion)  -     Ankle AROM (Therapeutic Exercise) bilateral;dorsiflexion;plantarflexion;20 repititions  -       Row Name 01/01/25 0824          Balance    Dynamic Standing Balance standby assist  -LS     Position/Device Used, Standing Balance supported;walker, front-wheeled  -LS     Balance Interventions standing;sit to stand;supported;weight shifting activity  -               User Key  (r) = Recorded By, (t) = Taken By, (c) = Cosigned By      Initials Name Provider Type    Mai Courtney PT Physical Therapist                   Goals/Plan       Row Name 01/01/25 0824          Bed Mobility Goal 1 (PT)    Activity/Assistive Device (Bed Mobility Goal 1, PT) sit to supine/supine to sit  -LS     [place]/Cues Needed (Bed Mobility Goal 1, PT) independent  -LS     Time Frame (Bed Mobility Goal 1, PT) short term goal (STG);5 days  -LS     Progress/Outcomes (Bed Mobility Goal 1,  PT) goal revised this date  -       Row Name 01/01/25 0824          Transfer Goal 1 (PT)    Activity/Assistive Device (Transfer Goal 1, PT) sit-to-stand/stand-to-sit  -LS     [place]/Cues Needed (Transfer Goal 1, PT) modified independence  -LS     Time Frame (Transfer Goal 1, PT) long term goal (LTG);10 days  -LS     Progress/Outcome (Transfer Goal 1, PT) goal ongoing;goal revised this date  -       Row Name 01/01/25 0824          Gait Training Goal 1 (PT)    Activity/Assistive Device (Gait Training Goal 1, PT) gait (walking locomotion);assistive device use;walker, rolling  -LS     [place] Level (Gait Training Goal 1, PT) modified independence  -LS     Distance (Gait Training Goal 1, PT) 350  -LS     Time Frame (Gait Training Goal 1, PT) long term goal (LTG);10 days  -LS     Progress/Outcome (Gait Training Goal 1, PT) goal ongoing;goal revised this date  -               User Key  (r) = Recorded By, (t) = Taken By, (c) = Cosigned By      Initials Name Provider Type    LS Mai Toney, PT Physical Therapist                   Clinical Impression       Row Name 01/01/25 0824          Pain    Pretreatment Pain Rating 0/10 - no pain  -LS     Posttreatment Pain Rating 0/10 - no pain  -LS     Pre/Posttreatment Pain Comment no pain at rest.  R knee pain with movement and transitioning sit to and from supine.  -       Row Name 01/01/25 0824          Plan of Care Review    Plan of Care Reviewed With patient  -LS     Progress improving  -LS     Outcome Evaluation Improving mobility. Pt is able to mobilize with less assistance and increased her walking distance. She is declining HHPT and plans to go home with her spouse.  -       Row Name 01/01/25 0824          Positioning and Restraints    Pre-Treatment Position in bed  -LS     Post Treatment Position bed  -LS     In Bed notified nsg;fowlers;call light within reach;encouraged to call for assist;exit alarm on  -LS               User Key  (r) =  Recorded By, (t) = Taken By, (c) = Cosigned By      Initials Name Provider Type    Mai Courtney, RAGINI Physical Therapist                   Outcome Measures       Row Name 01/01/25 0824          How much help from another person do you currently need...    Turning from your back to your side while in flat bed without using bedrails? 4  -LS     Moving from lying on back to sitting on the side of a flat bed without bedrails? 3  -LS     Moving to and from a bed to a chair (including a wheelchair)? 3  -LS     Standing up from a chair using your arms (e.g., wheelchair, bedside chair)? 3  -LS     Climbing 3-5 steps with a railing? 3  -LS     To walk in hospital room? 3  -LS     AM-PAC 6 Clicks Score (PT) 19  -LS     Highest Level of Mobility Goal 6 --> Walk 10 steps or more  -       Row Name 01/01/25 0824          Functional Assessment    Outcome Measure Options AM-PAC 6 Clicks Basic Mobility (PT)  -               User Key  (r) = Recorded By, (t) = Taken By, (c) = Cosigned By      Initials Name Provider Type    Mai Courtney, PT Physical Therapist                                 Physical Therapy Education       Title: PT OT SLP Therapies (In Progress)       Topic: Physical Therapy (In Progress)       Point: Mobility training (Done)       Learning Progress Summary            Patient Acceptance, E, VU by  at 1/1/2025 0824    Acceptance, E, VU,NR by  at 12/29/2024 0930                      Point: Home exercise program (Not Started)       Learner Progress:  Not documented in this visit.              Point: Body mechanics (Not Started)       Learner Progress:  Not documented in this visit.              Point: Precautions (Not Started)       Learner Progress:  Not documented in this visit.                              User Key       Initials Effective Dates Name Provider Type Discipline     07/11/23 -  Mai Toney, RAGINI Physical Therapist PT                  PT Recommendation and Plan  Planned  Therapy Interventions (PT): balance training, bed mobility training, gait training, home exercise program, patient/family education, transfer training, strengthening  Progress: improving  Outcome Evaluation: Improving mobility. Pt is able to mobilize with less assistance and increased her walking distance. She is declining HHPT and plans to go home with her spouse.     Time Calculation:   PT Evaluation Complexity  History, PT Evaluation Complexity: 3 or more personal factors and/or comorbidities  Examination of Body Systems (PT Eval Complexity): total of 4 or more elements  Clinical Presentation (PT Evaluation Complexity): evolving  Clinical Decision Making (PT Evaluation Complexity): moderate complexity  Overall Complexity (PT Evaluation Complexity): moderate complexity     PT Charges       Row Name 01/01/25 0842             Time Calculation    Start Time 0842  -LS      PT Received On 01/01/25  -LS      PT Goal Re-Cert Due Date 02/08/25  -LS         Timed Charges    25418 - PT Therapeutic Activity Minutes 29  -LS         Total Minutes    Timed Charges Total Minutes 29  -LS       Total Minutes 29  -LS                User Key  (r) = Recorded By, (t) = Taken By, (c) = Cosigned By      Initials Name Provider Type    Mai Courtney, PT Physical Therapist                  Therapy Charges for Today       Code Description Service Date Service Provider Modifiers Qty    60418963552 HC PT THERAPEUTIC ACT EA 15 MIN 1/1/2025 Mai Toney, PT GP 2            PT G-Codes  Outcome Measure Options: AM-PAC 6 Clicks Basic Mobility (PT)  AM-PAC 6 Clicks Score (PT): 19  AM-PAC 6 Clicks Score (OT): 17  PT Discharge Summary  Anticipated Discharge Disposition (PT): home    Mai Toney PT  1/1/2025

## 2025-01-01 NOTE — PROGRESS NOTES
Malnutrition Severity Assessment    Patient Name:  Oralia Woodruff  YOB: 1945  MRN: [id number]  Admit Date:  12/27/2024    Patient meets criteria for : Moderate (non-severe) Malnutrition (Pt meets criteria for moderate chronic malnutrition based on wasting.)    Comments:      Malnutrition Severity Assessment  Malnutrition Type: Chronic Disease - Related Malnutrition  Malnutrition Type (Last 8 Hours)       Malnutrition Severity Assessment       Row Name 12/31/24 2016       Malnutrition Severity Assessment    Malnutrition Type Chronic Disease - Related Malnutrition      Row Name 12/31/24 2016       Insufficient Energy Intake     Insufficient Energy Intake Findings --  unable to quantify, Indication is that intake is not at poor level; however, not maintaining wt.      Row Name 12/31/24 2016       Unintentional Weight Loss     Unintentional Weight Loss Findings --  has been continuous, gradual over 2 yrs.      Row Name 12/31/24 2016       Muscle Loss    Loss of Muscle Mass Findings Mild    Sabianist Region Moderate - slight depression    Clavicle Bone Region Severe - protruding prominent bone    Acromion Bone Region Moderate - acromion may slightly protrude    Scapular Bone Region Severe - prominent bones, depressions easily visible between ribs, scapula, spine, shoulders    Dorsal Hand Region --  mild    Patellar Region --  unable to determine at this time    Anterior Thigh Region --  unable to determne at this time    Posterior Calf Region --  unable to determine at this time      Row Name 12/31/24 2016       Fat Loss    Subcutaneous Fat Loss Findings Moderate    Orbital Region  Moderate -  somewhat hollowness, slightly dark circles    Upper Arm Region --  mild    Thoracic & Lumbar Region Moderate - ribs visible with mild depressions, iliac crest somewhat prominent      Row Name 12/31/24 2016       Criteria Met (Must meet criteria for severity in at least 2 of these categories: M Wasting, Fat Loss,  Fluid, Secondary Signs, Wt. Status, Intake)    Patient meets criteria for  Moderate (non-severe) Malnutrition  Pt meets criteria for moderate chronic malnutrition based on wasting.                    Electronically signed by:  Liane Meier RD  12/31/24 20:44 EST

## 2025-01-02 LAB
PHOSPHATE SERPL-MCNC: 3.3 MG/DL (ref 2.5–4.5)
PTH-INTACT SERPL-MCNC: 3728 PG/ML (ref 15–65)
UFH PPP CHRO-ACNC: 0.5 IU/ML (ref 0.3–0.7)

## 2025-01-02 PROCEDURE — 97530 THERAPEUTIC ACTIVITIES: CPT

## 2025-01-02 PROCEDURE — 25010000002 HEPARIN (PORCINE) 25000-0.45 UT/250ML-% SOLUTION

## 2025-01-02 PROCEDURE — 97535 SELF CARE MNGMENT TRAINING: CPT

## 2025-01-02 PROCEDURE — 82306 VITAMIN D 25 HYDROXY: CPT | Performed by: INTERNAL MEDICINE

## 2025-01-02 PROCEDURE — 83970 ASSAY OF PARATHORMONE: CPT | Performed by: INTERNAL MEDICINE

## 2025-01-02 PROCEDURE — 84100 ASSAY OF PHOSPHORUS: CPT | Performed by: INTERNAL MEDICINE

## 2025-01-02 PROCEDURE — 85520 HEPARIN ASSAY: CPT

## 2025-01-02 RX ADMIN — Medication 10 ML: at 20:40

## 2025-01-02 RX ADMIN — APIXABAN 2.5 MG: 2.5 TABLET, FILM COATED ORAL at 12:59

## 2025-01-02 RX ADMIN — HEPARIN SODIUM 21 UNITS/KG/HR: 10000 INJECTION, SOLUTION INTRAVENOUS at 01:14

## 2025-01-02 RX ADMIN — APIXABAN 2.5 MG: 2.5 TABLET, FILM COATED ORAL at 20:46

## 2025-01-02 NOTE — CONSULTS
Referring Provider: Kyle Chowdhury   Reason for Consultation: XAVIER and Hypercalcemia     Subjective     Chief complaint weight loss     History of present illness: This is a 79 y.o. female with history of hypertension admitted for failure to thrive and significant weight loss.  She was brought in by her daughter who is visiting over the holiday. She was found to have elevated calcium and abnormal renal function. CT abdomen/pelvis concerning for a cystic lesion of the pancreas neoplasm.  Denies any fever, chills, rigors, rash, hematuria or hemoptysis. No family hx of kidney disease or autoimmune disease. Denies any hx of kidney stones. She was taking some NSAID on an off recently. Nephrology service has been consulted for XAVIER and Hypercalcemia.       History  Past Medical History:   Diagnosis Date    Hypertension    ,   Past Surgical History:   Procedure Laterality Date    CHOLECYSTECTOMY      EYE SURGERY      HYSTERECTOMY     , History reviewed. No pertinent family history.,   Social History     Socioeconomic History    Marital status:    Tobacco Use    Smoking status: Never    Smokeless tobacco: Never   Vaping Use    Vaping status: Never Used   Substance and Sexual Activity    Alcohol use: No    Drug use: No    Sexual activity: Defer     E-cigarette/Vaping    E-cigarette/Vaping Use Never User     Passive Exposure No     Counseling Given No      E-cigarette/Vaping Substances    Nicotine No     THC No     CBD No     Flavoring No      E-cigarette/Vaping Devices    Disposable No     Pre-filled or Refillable Cartridge No     Refillable Tank No     Pre-filled Pod No          ,   Medications Prior to Admission   Medication Sig Dispense Refill Last Dose/Taking    losartan (COZAAR) 25 MG tablet Take 2 tablets by mouth 2 (Two) Times a Day. 60 tablet 0     MULTIPLE VITAMINS-MINERALS PO Take  by mouth Daily.      , Scheduled Meds:  apixaban, 2.5 mg, Oral, Q12H  senna-docusate sodium, 2 tablet, Oral, BID  sodium  chloride, 10 mL, Intravenous, Q12H   , Continuous Infusions:   , PRN Meds:    acetaminophen **OR** acetaminophen **OR** acetaminophen    senna-docusate sodium **AND** polyethylene glycol **AND** bisacodyl **AND** bisacodyl    Calcium Replacement - Follow Nurse / BPA Driven Protocol    Magnesium Standard Dose Replacement - Follow Nurse / BPA Driven Protocol    nitroglycerin    ondansetron    Phosphorus Replacement - Follow Nurse / BPA Driven Protocol    Potassium Replacement - Follow Nurse / BPA Driven Protocol    Sodium Chloride (PF)    sodium chloride    sodium chloride    traMADol, and Allergies:  Patient has no active allergies.    Review of Systems  Pertinent items are noted in HPI    Objective     Vital Signs  Temp:  [97.5 °F (36.4 °C)-98.5 °F (36.9 °C)] 97.5 °F (36.4 °C)  Heart Rate:  [71-80] 71  Resp:  [18] 18  BP: (128-147)/(65-73) 147/73    I/O this shift:  In: 240 [P.O.:240]  Out: -   I/O last 3 completed shifts:  In: 1389.6 [P.O.:240; I.V.:899.6; IV Piggyback:250]  Out: -     Physical Exam:  General Appearance:    Alert, cooperative, in no acute distress   Head:    Normocephalic, without obvious abnormality, atraumatic   Eyes:            Conjunctivae and sclerae normal, no   icterus, no pallor, corneas clear, PERRLA           Neck:   Supple, trachea midline, no thyromegaly,  no JVD       Lungs:     Clear to auscultation,respirations regular, even and               unlabored    Heart:    Regular rhythm and normal rate, normal S1 and S2, no       murmur, no gallop, no rub, no click       Abdomen:     Normal bowel sounds,soft, non-tender, non-distended, no guarding, no rebound tenderness       Extremities:   Moves all extremities well, no edema, no cyanosis, no         redness   Pulses:   Pulses palpable and equal bilaterally           Neurologic:   Cranial nerves 2 - 12 grossly intact, no focal deficit         Results Review:   I reviewed the patient's new clinical results.    WBC WBC   Date Value Ref Range  "Status   12/31/2024 5.95 3.40 - 10.80 10*3/mm3 Final      HGB Hemoglobin   Date Value Ref Range Status   12/31/2024 9.9 (L) 12.0 - 15.9 g/dL Final      HCT Hematocrit   Date Value Ref Range Status   12/31/2024 31.8 (L) 34.0 - 46.6 % Final      Platlets No results found for: \"LABPLAT\"   MCV MCV   Date Value Ref Range Status   12/31/2024 94.9 79.0 - 97.0 fL Final          Sodium Sodium   Date Value Ref Range Status   01/01/2025 138 136 - 145 mmol/L Final   12/31/2024 139 136 - 145 mmol/L Final      Potassium Potassium   Date Value Ref Range Status   01/01/2025 4.5 3.5 - 5.2 mmol/L Final   12/31/2024 4.6 3.5 - 5.2 mmol/L Final      Chloride Chloride   Date Value Ref Range Status   01/01/2025 109 (H) 98 - 107 mmol/L Final   12/31/2024 110 (H) 98 - 107 mmol/L Final      CO2 CO2   Date Value Ref Range Status   01/01/2025 18.0 (L) 22.0 - 29.0 mmol/L Final   12/31/2024 19.0 (L) 22.0 - 29.0 mmol/L Final      BUN BUN   Date Value Ref Range Status   01/01/2025 24 (H) 8 - 23 mg/dL Final   12/31/2024 30 (H) 8 - 23 mg/dL Final      Creatinine Creatinine   Date Value Ref Range Status   01/01/2025 1.35 (H) 0.57 - 1.00 mg/dL Final   12/31/2024 1.45 (H) 0.57 - 1.00 mg/dL Final      Calcium Calcium   Date Value Ref Range Status   01/01/2025 7.8 (L) 8.6 - 10.5 mg/dL Final   12/31/2024 8.3 (L) 8.6 - 10.5 mg/dL Final      PO4 No results found for: \"CAPO4\"   Albumin No results found for: \"ALBUMIN\"   Magnesium Magnesium   Date Value Ref Range Status   01/01/2025 2.2 1.6 - 2.4 mg/dL Final      Uric Acid No results found for: \"URICACID\"     Results from last 7 days   Lab Units 01/01/25  0555 12/31/24  0610 12/31/24  0609 12/30/24  0335 12/29/24  1105 12/28/24  2144 12/28/24  2140 12/27/24  1642 12/27/24  1638   SODIUM mmol/L 138  --  139 139 140  --   --    < > 135*   POTASSIUM mmol/L 4.5  --  4.6 4.1 4.6  --   --    < > 4.7   CHLORIDE mmol/L 109*  --  110* 109* 110*  --   --    < > 105   CO2 mmol/L 18.0*  --  19.0* 21.0* 20.0*  --   --    < " > 20.0*   BUN mg/dL 24*  --  30* 34* 30*  --   --    < > 39*   CREATININE mg/dL 1.35*  --  1.45* 1.55* 1.70*  --   --    < > 1.60*   CALCIUM mg/dL 7.8*  --  8.3* 9.3 11.2*  --   --    < > 12.6*   ALBUMIN g/dL  --   --   --   --  3.4* 3.2  --   --  3.9   WBC 10*3/mm3  --  5.95  --  5.61 4.28  --  5.62   < >  --    HEMOGLOBIN g/dL  --  9.9*  --  9.3* 10.2*  --  9.8*   < >  --    PLATELETS 10*3/mm3  --  186  --  183 195  --  193   < >  --     < > = values in this interval not displayed.             apixaban, 2.5 mg, Oral, Q12H  senna-docusate sodium, 2 tablet, Oral, BID  sodium chloride, 10 mL, Intravenous, Q12H           Assessment & Plan       Pancreatic mass    Moderate protein-calorie malnutrition      1- XAVIER vs CKD - unknown baseline - Scr at presentation 1.7. Pre-renal azotemia - improving with hydration. On losartan at home. She took some NSAID recently before admission.   2- Hypercalcemia - Calcium 12.6 at presentation s/p Zometa 12/28 - resolved.  PTH elevated SIEP no M spike. Thyroid ultrasound suggestive of adenoma   3- Anemia   4- mild metabolic acidosis   5- DVT   6- Pancreatic cystic lesion concerning of malignancy     Plan:  - Repeat PTH and vit D level   - Will need parathyroid scan   - Surgery is following   - Replete electrolytes as needed   - Encourage oral hydration   - Monitor I/O   - Avoid nephrotoxic agents.   - Renal diet   - Adjust meds per renal function   - No emergent need of RRT   - Monitor H/H and transfuse for Hgb less than 7.0       I discussed the patients findings and my recommendations with patient and nursing staff    Josefina Cardoso MD  01/02/25

## 2025-01-02 NOTE — THERAPY TREATMENT NOTE
Patient Name: Oralia Woodruff  : 1945    MRN: [id number]                              Today's Date: 2025       Admit Date: 2024    Visit Dx:     ICD-10-CM ICD-9-CM   1. Pancreatic mass  K86.89 577.8   2. Acute abdominal pain  R10.9 789.00     338.19   3. Weight loss  R63.4 783.21     Patient Active Problem List   Diagnosis    Pancreatic mass    Moderate protein-calorie malnutrition     Past Medical History:   Diagnosis Date    Hypertension      Past Surgical History:   Procedure Laterality Date    CHOLECYSTECTOMY      EYE SURGERY      HYSTERECTOMY        General Information       Row Name 25 0920          OT Time and Intention    Document Type therapy note (daily note)  -AC     Mode of Treatment occupational therapy  -AC       Row Name 25 0920          General Information    Patient Profile Reviewed yes  -AC     Existing Precautions/Restrictions fall  -AC     Barriers to Rehab medically complex  -AC       Row Name 25 0920          Cognition    Orientation Status (Cognition) oriented x 4  -AC       Row Name 25 0920          Safety Issues/Impairments Affecting Functional Mobility    Safety Issues Affecting Function (Mobility) insight into deficits/self-awareness  -AC     Impairments Affecting Function (Mobility) balance;endurance/activity tolerance;pain;range of motion (ROM);strength  -AC               User Key  (r) = Recorded By, (t) = Taken By, (c) = Cosigned By      Initials Name Provider Type    AC Giselle Gallegos, OT Occupational Therapist                     Mobility/ADL's       Row Name 25 1045          Bed Mobility    Comment, (Bed Mobility) [facility] pre/post  -AC       Row Name 25 1045          Transfers    Transfers sit-stand transfer  -AC       Row Name 25 1045          Sit-Stand Transfer    Sit-Stand [place] (Transfers) standby assist  -AC     Assistive Device (Sit-Stand Transfers) walker, front-wheeled  -AC       Row Name 25 1045           Functional Mobility    Functional Mobility- Ind. Level verbal cues required;contact guard assist  -AC     Functional Mobility- Device walker, front-wheeled  -AC     Functional Mobility-Distance (Feet) --  household distance  -       Row Name 01/02/25 1045          Activities of Daily Living    BADL Assessment/Intervention lower body dressing;upper body dressing;grooming;bathing  -       Row Name 01/02/25 1045          Lower Body Dressing Assessment/Training    [place] Level (Lower Body Dressing) doff;don;pants/bottoms;minimum assist (75% patient effort)  under garment  -     Position (Lower Body Dressing) unsupported standing;unsupported sitting  -       Row Name 01/02/25 1045          Upper Body Dressing Assessment/Training    [place] Level (Upper Body Dressing) doff;don;front opening garment;minimum assist (75% patient effort)  -       Row Name 01/02/25 1045          Grooming Assessment/Training    [place] Level (Grooming) hair care, combing/brushing;oral care regimen;standby assist  -AC     Position (Grooming) supported standing  -       Row Name 01/02/25 1045          Bathing Assessment/Intervention    [place] Level (Bathing) upper extremities;chest/trunk;proximal lower extremities;perineal area;set up;contact guard assist  -AC     Position (Bathing) supported standing  -               User Key  (r) = Recorded By, (t) = Taken By, (c) = Cosigned By      Initials Name Provider Type    Giselle Wood, OT Occupational Therapist                   Obj/Interventions       Row Name 01/02/25 1048          Balance    Balance Assessment sitting static balance;sitting dynamic balance;standing static balance;standing dynamic balance  -AC     Static Sitting Balance standby assist  -AC     Dynamic Sitting Balance contact guard  -AC     Position, Sitting Balance unsupported  -AC     Static Standing Balance standby assist  -AC     Dynamic Standing Balance contact guard  -AC      Position/Device Used, Standing Balance supported;walker, front-wheeled  -               User Key  (r) = Recorded By, (t) = Taken By, (c) = Cosigned By      Initials Name Provider Type    AC Giselle Gallegos, OT Occupational Therapist                   Goals/Plan    No documentation.                  Clinical Impression       Row Name 01/02/25 1048          Pain Assessment    Pretreatment Pain Rating 4/10  -AC     Posttreatment Pain Rating 4/10  -AC     Pain Location extremity  -AC     Pain Side/Orientation bilateral;lower  -AC     Pain Management Interventions exercise or physical activity utilized  -     Response to Pain Interventions activity participation with tolerable pain  -       Row Name 01/02/25 1048          Plan of Care Review    Plan of Care Reviewed With patient;family  -     Progress improving  -     Outcome Evaluation Pt progressed from mod A to min A LBD, min A UBD,  CGA sinkside bathig,  CGA to ambulate with RW.  Pt is progressing, but continues below baseline with self care d/t weakness and decr activity tolerance.  Pt would like to go home with spouse and OP therapy.  -       Row Name 01/02/25 1048          Therapy Plan Review/Discharge Plan (OT)    Equipment Needs Upon Discharge (OT) shower chair;other (see comments)  BSC and rollator  -     Anticipated Discharge Disposition (OT) other (see comments)  pt would like to go home with assist and OP therapy  -       Row Name 01/02/25 1048          Vital Signs    Pre Systolic BP Rehab 147  -AC     Pre Treatment Diastolic BP 73  -AC     Posttreatment Heart Rate (beats/min) 85  -AC     Post SpO2 (%) 100  -AC     O2 Delivery Post Treatment room air  -AC     Pre Patient Position Sitting  -AC     Post Patient Position Sitting  -       Row Name 01/02/25 1048          Positioning and Restraints    Pre-Treatment Position sitting in chair/recliner  -AC     Post Treatment Position chair  -AC     In Chair notified nsg;reclined;call light within  reach;encouraged to call for assist;waffle cushion;with family/caregiver  -               User Key  (r) = Recorded By, (t) = Taken By, (c) = Cosigned By      Initials Name Provider Type    Giselle Wood, MIAH Occupational Therapist                   Outcome Measures       Row Name 01/02/25 1054          How much help from another is currently needed...    Putting on and taking off regular lower body clothing? 3  -AC     Bathing (including washing, rinsing, and drying) 3  -AC     Toileting (which includes using toilet bed pan or urinal) 3  -AC     Putting on and taking off regular upper body clothing 3  -AC     Taking care of personal grooming (such as brushing teeth) 3  -AC     Eating meals 4  -AC     AM-PAC 6 Clicks Score (OT) 19  -AC       Row Name 01/02/25 1054          Functional Assessment    Outcome Measure Options AM-PAC 6 Clicks Daily Activity (OT)  -               User Key  (r) = Recorded By, (t) = Taken By, (c) = Cosigned By      Initials Name Provider Type    Giselle Wood, MIAH Occupational Therapist                    Occupational Therapy Education       Title: PT OT SLP Therapies (In Progress)       Topic: Occupational Therapy (In Progress)       Point: ADL training (In Progress)       Description:   Instruct learner(s) on proper safety adaptation and remediation techniques during self care or transfers.   Instruct in proper use of assistive devices.                  Learning Progress Summary            Patient Acceptance, E, NR by  at 1/2/2025 1055    Acceptance, E, NR by  at 12/29/2024 1035                      Point: Home exercise program (Not Started)       Description:   Instruct learner(s) on appropriate technique for monitoring, assisting and/or progressing therapeutic exercises/activities.                  Learner Progress:  Not documented in this visit.              Point: Precautions (Not Started)       Description:   Instruct learner(s) on prescribed precautions during self-care  and functional transfers.                  Learner Progress:  Not documented in this visit.              Point: Body mechanics (Not Started)       Description:   Instruct learner(s) on proper positioning and spine alignment during self-care, functional mobility activities and/or exercises.                  Learner Progress:  Not documented in this visit.                              User Key       Initials Effective Dates Name Provider Type Discipline     02/03/23 -  Giselle Gallegos, OT Occupational Therapist OT                  OT Recommendation and Plan  Planned Therapy Interventions (OT): activity tolerance training, BADL retraining, functional balance retraining, occupation/activity based interventions, patient/caregiver education/training, strengthening exercise, transfer/mobility retraining  Therapy Frequency (OT): daily  Plan of Care Review  Plan of Care Reviewed With: patient, family  Progress: improving  Outcome Evaluation: Pt progressed from mod A to min A LBD, min A UBD,  CGA sinkside bathig,  CGA to ambulate with RW.  Pt is progressing, but continues below baseline with self care d/t weakness and decr activity tolerance.  Pt would like to go home with spouse and OP therapy.     Time Calculation:   Evaluation Complexity (OT)  Review Occupational Profile/Medical/Therapy History Complexity: expanded/moderate complexity  Assessment, Occupational Performance/Identification of Deficit Complexity: 3-5 performance deficits  Clinical Decision Making Complexity (OT): detailed assessment/moderate complexity  Overall Complexity of Evaluation (OT): moderate complexity     Time Calculation- OT       Row Name 01/02/25 0920             Time Calculation- OT    OT Start Time 0920  -AC      OT Received On 01/02/25  -      OT Goal Re-Cert Due Date 01/08/25  -AC         Timed Charges    42943 - OT Therapeutic Activity Minutes 10  -AC      78599 - OT Self Care/Mgmt Minutes 30  -AC         Total Minutes    Timed Charges  Total Minutes 40  -AC       Total Minutes 40  -AC                User Key  (r) = Recorded By, (t) = Taken By, (c) = Cosigned By      Initials Name Provider Type    AC Giselle Gallegos, OT Occupational Therapist                  Therapy Charges for Today       Code Description Service Date Service Provider Modifiers Qty    07100681281  OT THERAPEUTIC ACT EA 15 MIN 1/2/2025 Giselle Gallegos, OT GO 1    04640383343  OT SELF CARE/MGMT/TRAIN EA 15 MIN 1/2/2025 Giselle Gallegos OT GO 2                 Giselle Gallegos OT  1/2/2025

## 2025-01-02 NOTE — CONSULTS
Patient Name:  Oralia Woodruff  YOB: 1945  [id number]       Patient Care Team:  Yanni Olsen MD as PCP - General (Family Medicine)      General Surgery Consult Note     Date of Consultation: 01/02/25    Referring Physician - Kyle Chowdhury MD    Reason for Consult -hyperparathyroidism    Subjective     I have been asked to see  Oralia Woodruff , a 79 y.o. female in consultation for hyperparathyroidism from Dr. Chowdhury.  The patient presented to the emergency room with overall general decline including decreased ambulation and decreased p.o. intake along with weight loss.  She was also noted to have right lower quadrant abdominal pain and right leg pain.  Workup of leg pain identified acute DVT.  Lab workup showed elevated calcium level to 12.6 along with elevated PTH level to 2070, consistent with primary hyperparathyroidism.  The calcium level was able to be normalized with medical treatment.  The patient's symptoms have improved and the patient is interested in going home.      Allergy: No Active Allergies    Medications:  senna-docusate sodium, 2 tablet, Oral, BID  sodium chloride, 10 mL, Intravenous, Q12H      heparin, 21 Units/kg/hr, Last Rate: 21 Units/kg/hr (01/02/25 0114)  Pharmacy to Dose Heparin,       No current facility-administered medications on file prior to encounter.     Current Outpatient Medications on File Prior to Encounter   Medication Sig    losartan (COZAAR) 25 MG tablet Take 2 tablets by mouth 2 (Two) Times a Day.    MULTIPLE VITAMINS-MINERALS PO Take  by mouth Daily.       PMHx:   Past Medical History:   Diagnosis Date    Hypertension        Past Surgical History:  Past Surgical History:   Procedure Laterality Date    CHOLECYSTECTOMY      EYE SURGERY      HYSTERECTOMY          Family History:   History reviewed. No pertinent family history.     Social History:   Social History     Socioeconomic History    Marital status:    Tobacco Use     "Smoking status: Never    Smokeless tobacco: Never   Vaping Use    Vaping status: Never Used   Substance and Sexual Activity    Alcohol use: No    Drug use: No    Sexual activity: Defer         Review of Systems        Constitutional: See HPI   Eyes: Denies visual changes    Cardiovascular: Denies chest pain, palpitations   Pulmonary: Denies cough or shortness of breath   Abdominal/ GI: No nausea or vomiting, no recent change in bowel habits   Genitourinary: Denies dysuria or hematuria   Musculoskeletal: Denies any but chronic joint aches, pains or deformities   Psychiatric: No recent mood changes   Neurologic: No paresthesias or loss of function          Objective     Physical Exam:      Vital Signs  /65 (BP Location: Left arm, Patient Position: Lying)   Pulse 71   Temp 97.7 °F (36.5 °C) (Oral)   Resp 18   Ht 162.6 cm (64\")   Wt 49.9 kg (110 lb)   SpO2 97%   BMI 18.88 kg/m²     Intake/Output Summary (Last 24 hours) at 1/2/2025 0712  Last data filed at 1/1/2025 1800  Gross per 24 hour   Intake 1389.58 ml   Output --   Net 1389.58 ml         Physical Exam:    Head: Normocephalic, atraumatic.   Eyes: Pupils equal, round, react to light and accommodation.   Mouth: Oral mucosa without lesions  Neck: No masses, lymphadenopathy or carotid bruits bilaterally  CV: Rhythm and rate regular, no murmurs, rubs or gallops  Lungs: Clear to auscultation bilaterally  Abdomen: Bowel sounds positive, soft, nontender, nondistended  Groin : No obvious hernias bilaterally  Extremities:  No cyanosis, clubbing or edema bilaterally  Lymphatics: No abnormal lymphadenopathy appreciated  Neurologic: No gross deficits      Results Review: I have personally reviewed all of the recent lab and imaging results available at this time.   Creatinine 1.35  Calcium 7.8  WBC 6  Hemoglobin 9.9    Neck ultrasound 12/31/2024:  1. Large heterogeneous thyroid gland, consistent with goiter.  2. Single 0.5 x 0.5 x 8 mm isoechoic thyroid nodule, TI " RADS category 3, with no need for follow-up by TI-RADS criteria.  3. Sharply-defined, markedly hypoechoic, but mostly solid appearing nodule along the dorsal mid and inferior portion of the right thyroid lobe, features considered typical of parathyroid adenoma.           Assessment & Plan     Assessment and Plan:    Patient with primary hyperparathyroidism with likely localized parathyroid adenoma on imaging.  Calcium normalized after medical treatment.  The patient will need outpatient evaluation for parathyroidectomy.  Would prioritize DVT treatment at this time.  I discussed this with the patient and her daughter-in-law and both were agreeable to close outpatient follow-up.  Please have her schedule with me in my clinic.      I discussed the patient's findings and my recommendations with the patient and/or family, as well as the primary team     Leonardo Clancy MD  01/02/25  07:12 EST

## 2025-01-02 NOTE — PLAN OF CARE
Goal Outcome Evaluation:  Plan of Care Reviewed With: patient, family        Progress: improving  Outcome Evaluation: Pt progressed from mod A to min A LBD, min A UBD,  CGA sinkside bathing,  CGA to ambulate with RW.  Pt is progressing, but continues below baseline with self care d/t weakness and decr activity tolerance.  Pt would like to go home with spouse and OP therapy.    Anticipated Discharge Disposition (OT): other (see comments) (pt would like to go home with assist and OP therapy)

## 2025-01-02 NOTE — CASE MANAGEMENT/SOCIAL WORK
Continued Stay Note   [place]     Patient Name: Oralia Woodruff  MRN: [id number]  Today's Date: 1/2/2025    Admit Date: 12/27/2024    Plan: Home with spouse & outpatient therapy   Discharge Plan       Row Name 01/02/25 1206       Plan    Plan Home with spouse & outpatient therapy    Patient/Family in Agreement with Plan yes    Plan Comments Discussed patient in MDR. General surgery to see. Heparin drip. CM spoke to patient and daughter-in-law at bedside. Patient is no longer agreeable to IPR. Her plan is home with her spouse and outpatient therapy. Order in epic. Patient requested a rollator & bedside commode. Explained that the bedside commode may or may not be covered by insurance. Daughter-in-law verbalized understanding. Orders are in Epic. Will send to [employer] closer to discharge. CM will continue to follow.    Final Discharge Disposition Code 01 - home or self-care                   Discharge Codes    No documentation.                 Expected Discharge Date and Time       Expected Discharge Date Expected Discharge Time    Dec 31, 2024               Ashlie Blankenship RN

## 2025-01-02 NOTE — PROGRESS NOTES
[facility] Medicine Services  PROGRESS NOTE    Patient Name: Oralia Woodruff  : 1945  MRN: [id number]    Date of Admission: 2024  Primary Care Physician: Yanni Olsen MD    Subjective   Subjective     CC:  hypercalcemia    HPI:  Resting in bed no acute distress does not have any specific complaint.  No fever or chills.  No chest pain palpitation shortness of breath.  No nausea vomiting or diarrhea.  Had a very long conversation with the patient and explained the significance of elevated parathyroid hormone and also elevated calcium.  We discussed follow-up options.      Objective   Objective     Vital Signs:   Temp:  [97.5 °F (36.4 °C)-98.5 °F (36.9 °C)] 97.5 °F (36.4 °C)  Heart Rate:  [71-80] 71  Resp:  [18] 18  BP: (128-147)/(65-73) 147/73     Physical Exam:  Constitutional: No acute distress, awake, alert  HENT: NCAT, mucous membranes moist  Respiratory: Clear to auscultation bilaterally, respiratory effort normal  GI: Abdomen is soft, not tender, not distended, bowel sounds are present  Musculoskeletal:   edema of lower extremities bilaterally.  Psychiatric: Appropriate affect, cooperative  Neurologic: Oriented x 3, no focality appreciated, speech clear  Skin: No rashes      Results Reviewed:  LAB RESULTS:      Lab 25  0333 25  0555 24  2340 24  1637 24  0610 24  1138 24  0335 24  1846 24  1105 24  0044 24  2144 24  2140 24  0411 24  1839 24  1638 24  1517   WBC  --   --   --   --  5.95  --  5.61  --  4.28  --   --  5.62 4.97  --   --  6.27   HEMOGLOBIN  --   --   --   --  9.9*  --  9.3*  --  10.2*  --   --  9.8* 10.2*  --   --  11.9*   HEMATOCRIT  --   --   --   --  31.8*  --  30.0*  --  31.8*  --   --  30.8* 32.7*  --   --  37.5   PLATELETS  --   --   --   --  186  --  183  --  195  --   --  193 194  --   --  207   NEUTROS ABS  --   --   --   --  3.75  --  3.18   --  2.60  --   --  3.30 2.69  --   --  4.45   IMMATURE GRANS (ABS)  --   --   --   --  0.07*  --  0.01  --  0.01  --   --  0.01 0.01  --   --  0.01   LYMPHS ABS  --   --   --   --  1.40  --  1.79  --  1.23  --   --  1.74 1.68  --   --  1.37   MONOS ABS  --   --   --   --  0.54  --  0.48  --  0.33  --   --  0.42 0.46  --   --  0.38   EOS ABS  --   --   --   --  0.15  --  0.12  --  0.09  --   --  0.12 0.10  --   --  0.03   MCV  --   --   --   --  94.9  --  95.2  --  94.4  --   --  94.2 96.2  --   --  94.7   LACTATE  --   --   --   --   --   --   --   --   --   --   --   --   --   --   --  0.9   PROTIME  --   --   --   --   --   --   --   --   --   --  14.5  --   --   --   --   --    APTT  --   --   --   --   --   --   --   --   --   --  49.2*  --   --   --   --   --    HEPARIN ANTI-XA 0.50 0.38 0.41 0.25* 0.23*   < >  --    < > 0.45   < > 0.11*  --   --   --   --   --    HSTROP T  --   --   --   --   --   --   --   --   --   --   --   --   --  17* 16*  --     < > = values in this interval not displayed.         Lab 01/02/25 0333 01/01/25  1859 01/01/25  0555 12/31/24  0609 12/30/24  0335 12/29/24  1105 12/28/24  0411   SODIUM  --   --  138 139 139 140 138   POTASSIUM  --   --  4.5 4.6 4.1 4.6 4.1   CHLORIDE  --   --  109* 110* 109* 110* 108*   CO2  --   --  18.0* 19.0* 21.0* 20.0* 19.0*   ANION GAP  --   --  11.0 10.0 9.0 10.0 11.0   BUN  --   --  24* 30* 34* 30* 36*   CREATININE  --   --  1.35* 1.45* 1.55* 1.70* 1.51*   EGFR  --   --  39.8* 36.5* 33.7* 30.2* 34.8*   GLUCOSE  --   --  110* 84 96 97 87   CALCIUM  --   --  7.8* 8.3* 9.3 11.2* 12.1*   IONIZED CALCIUM  --   --  1.12*  --   --  1.49*  --    MAGNESIUM  --   --  2.2  --   --   --   --    PHOSPHORUS 3.3 5.2* 1.6*  --   --   --   --          Lab 12/29/24  1105 12/28/24  2144 12/27/24  1638   TOTAL PROTEIN 5.5*  --  6.7   ALBUMIN 3.4* 3.2 3.9   GLOBULIN 2.1  --  2.8   ALT (SGPT) 9  --  10   AST (SGOT) 18  --  20   BILIRUBIN <0.2  --  0.3   ALK PHOS 388*  --   440*   LIPASE  --   --  86*         Lab 12/28/24  2144 12/27/24  1839 12/27/24  1638   HSTROP T  --  17* 16*   PROTIME 14.5  --   --    INR 1.12  --   --              Lab 12/29/24  1105   IRON 71   IRON SATURATION (TSAT) 29   TIBC 246*   TRANSFERRIN 165*   FOLATE 14.50   VITAMIN B 12 750         Brief Urine Lab Results  (Last result in the past 365 days)        Color   Clarity   Blood   Leuk Est   Nitrite   Protein   CREAT   Urine HCG        12/27/24 1518 Yellow   Clear   Negative   Negative   Negative   30 mg/dL (1+)                   Microbiology Results Abnormal       None            No radiology results from the last 24 hrs    Results for orders placed during the hospital encounter of 12/27/24    Adult Transthoracic Echo Complete W/ Cont if Necessary Per Protocol    Interpretation Summary    Left ventricular systolic function is normal. Calculated left ventricular EF = 59.2% Left ventricular ejection fraction appears to be 56 - 60%.    Left ventricular diastolic function was normal.      Current medications:  Scheduled Meds:apixaban, 2.5 mg, Oral, Q12H  senna-docusate sodium, 2 tablet, Oral, BID  sodium chloride, 10 mL, Intravenous, Q12H      Continuous Infusions:     PRN Meds:.  acetaminophen **OR** acetaminophen **OR** acetaminophen    senna-docusate sodium **AND** polyethylene glycol **AND** bisacodyl **AND** bisacodyl    Calcium Replacement - Follow Nurse / BPA Driven Protocol    Magnesium Standard Dose Replacement - Follow Nurse / BPA Driven Protocol    nitroglycerin    ondansetron    Phosphorus Replacement - Follow Nurse / BPA Driven Protocol    Potassium Replacement - Follow Nurse / BPA Driven Protocol    Sodium Chloride (PF)    sodium chloride    sodium chloride    traMADol    Assessment & Plan   Assessment & Plan     Active Hospital Problems    Diagnosis  POA    **Pancreatic mass [K86.89]  Yes    Moderate protein-calorie malnutrition [E44.0]  Yes      Resolved Hospital Problems   No resolved problems to  display.        Brief Hospital Course to date:  Oralai Woodruff is a 79 y.o. female with history of hypertension admitted for failure to thrive and significant weight loss.  She was brought in by her daughter who is visiting over the holiday.  CT abdomen/pelvis concerning for a cystic lesion of the pancreas neoplasm. Workup in progress. Oncology following     Abdominal pain  Weight loss  Pancreatic cystic lesion  -Concern for malignancy, oncology consulted  -CA 19-9 elevated but very mildly  -Bone scan shows diffuse uptake very compatible with elevated parathyroid hormone     Mild hypercalcemia  Very elevated parathyroid hormone  -Hypercalcemia improved with IVF  -S/p Zometa times 1 on 12/28  -Thyroid ultrasound was done which showed a nodule very compatible with parathyroid adenoma.    -Surgery has seen and evaluated the patient and talked with the patient.  Patient can follow-up with Dr. Clancy as outpatient.     RLE pain  R LE and LLE DVT  -Acute right lower extremity deep vein thrombosis noted in the popliteal and peroneal.  - Acute left lower extremity deep vein thrombosis noted in the soleal  -heparin drip started 12/28     Renal insufficiency  Unsure of baseline CKD  -Nephrology also has seen and evaluated patient.     Anemia, normocytic     Debility, weakness  -PT/OT    Note: Total time spent was 50 minutes.      Expected Discharge Location and Transportation: [facility]  Expected Discharge   Expected Discharge Date: 12/31/2024; Expected Discharge Time:      VTE Prophylaxis:  Pharmacologic VTE prophylaxis orders are present.         AM-PAC 6 Clicks Score (PT): 20 (01/02/25 0800)    CODE STATUS:   Code Status and Medical Interventions: CPR (Attempt to Resuscitate); Full Support   Ordered at: 12/27/24 7941     Code Status (Patient has no pulse and is not breathing):    CPR (Attempt to Resuscitate)     Medical Interventions (Patient has pulse or is breathing):    Full Support       Kyle Chowdhury  MD  01/02/25

## 2025-01-03 ENCOUNTER — READMISSION MANAGEMENT (OUTPATIENT)
Dept: CALL CENTER | Facility: HOSPITAL | Age: 80
End: 2025-01-03
Payer: MEDICARE

## 2025-01-03 ENCOUNTER — TELEPHONE (OUTPATIENT)
Age: 80
End: 2025-01-03

## 2025-01-03 VITALS
HEIGHT: 64 IN | RESPIRATION RATE: 18 BRPM | HEART RATE: 104 BPM | SYSTOLIC BLOOD PRESSURE: 136 MMHG | DIASTOLIC BLOOD PRESSURE: 78 MMHG | TEMPERATURE: 96.5 F | OXYGEN SATURATION: 100 % | BODY MASS INDEX: 18.78 KG/M2 | WEIGHT: 110 LBS

## 2025-01-03 LAB
25(OH)D3 SERPL-MCNC: 40.5 NG/ML (ref 30–100)
ANION GAP SERPL CALCULATED.3IONS-SCNC: 8 MMOL/L (ref 5–15)
BUN SERPL-MCNC: 29 MG/DL (ref 8–23)
BUN/CREAT SERPL: 20.4 (ref 7–25)
CA-I SERPL ISE-MCNC: 1.11 MMOL/L (ref 1.15–1.3)
CALCIUM SPEC-SCNC: 8.4 MG/DL (ref 8.6–10.5)
CHLORIDE SERPL-SCNC: 112 MMOL/L (ref 98–107)
CO2 SERPL-SCNC: 19 MMOL/L (ref 22–29)
CREAT SERPL-MCNC: 1.42 MG/DL (ref 0.57–1)
EGFRCR SERPLBLD CKD-EPI 2021: 37.5 ML/MIN/1.73
GLUCOSE SERPL-MCNC: 130 MG/DL (ref 65–99)
POTASSIUM SERPL-SCNC: 4.6 MMOL/L (ref 3.5–5.2)
SODIUM SERPL-SCNC: 139 MMOL/L (ref 136–145)

## 2025-01-03 PROCEDURE — 97116 GAIT TRAINING THERAPY: CPT

## 2025-01-03 PROCEDURE — 80048 BASIC METABOLIC PNL TOTAL CA: CPT | Performed by: INTERNAL MEDICINE

## 2025-01-03 PROCEDURE — 82330 ASSAY OF CALCIUM: CPT | Performed by: INTERNAL MEDICINE

## 2025-01-03 PROCEDURE — 97110 THERAPEUTIC EXERCISES: CPT

## 2025-01-03 RX ORDER — CINACALCET 30 MG/1
30 TABLET, FILM COATED ORAL
Qty: 30 TABLET | Refills: 0 | Status: SHIPPED | OUTPATIENT
Start: 2025-01-03

## 2025-01-03 RX ORDER — CINACALCET 30 MG/1
30 TABLET, FILM COATED ORAL
Status: DISCONTINUED | OUTPATIENT
Start: 2025-01-03 | End: 2025-01-03 | Stop reason: HOSPADM

## 2025-01-03 RX ADMIN — ACETAMINOPHEN 650 MG: 325 TABLET ORAL at 10:39

## 2025-01-03 RX ADMIN — Medication 10 ML: at 08:27

## 2025-01-03 RX ADMIN — APIXABAN 2.5 MG: 2.5 TABLET, FILM COATED ORAL at 08:27

## 2025-01-03 RX ADMIN — CINACALCET 30 MG: 30 TABLET ORAL at 13:40

## 2025-01-03 NOTE — THERAPY TREATMENT NOTE
Patient Name: Oralia Woodruff  : 1945    MRN: [id number]                              Today's Date: 1/3/2025       Admit Date: 2024    Visit Dx:     ICD-10-CM ICD-9-CM   1. Pancreatic mass  K86.89 577.8   2. Acute abdominal pain  R10.9 789.00     338.19   3. Weight loss  R63.4 783.21   4. Decreased activity tolerance  R68.89 780.99     Patient Active Problem List   Diagnosis    Pancreatic mass    Moderate protein-calorie malnutrition     Past Medical History:   Diagnosis Date    Hypertension      Past Surgical History:   Procedure Laterality Date    CHOLECYSTECTOMY      EYE SURGERY      HYSTERECTOMY        General Information       Row Name 25 0910          Physical Therapy Time and Intention    Document Type therapy note (daily note)  -KW     Mode of Treatment physical therapy  -KW       Row Name 25 0910          General Information    Patient Profile Reviewed yes  -KW     Existing Precautions/Restrictions fall  -KW       Row Name 25 0910          Cognition    Orientation Status (Cognition) oriented x 4  -KW               User Key  (r) = Recorded By, (t) = Taken By, (c) = Cosigned By      Initials Name Provider Type    KW Irina Matthews PT Physical Therapist                   Mobility       Row Name 25 0910          Sit-Stand Transfer    Sit-Stand Beckham (Transfers) standby assist  -KW     Assistive Device (Sit-Stand Transfers) walker, front-wheeled  -KW       Row Name 25 0910          Gait/Stairs (Locomotion)    Beckham Level (Gait) standby assist  -KW     Assistive Device (Gait) walker, front-wheeled  -KW     Distance in Feet (Gait) 300  -KW     Deviations/Abnormal Patterns (Gait) gait speed decreased;stride length decreased  -KW     Bilateral Gait Deviations heel strike decreased  -KW               User Key  (r) = Recorded By, (t) = Taken By, (c) = Cosigned By      Initials Name Provider Type    Irina Romero PT Physical Therapist                    Obj/Interventions    No documentation.                  Goals/Plan    No documentation.                  Clinical Impression       Row Name 01/03/25 0910          Pain    Pretreatment Pain Rating 5/10  -KW     Posttreatment Pain Rating 6/10  -KW     Pain Location hip  -KW     Pain Side/Orientation right  -KW     Response to Pain Interventions activity participation with increased pain;activity level improved  -KW       Row Name 01/03/25 0910          Plan of Care Review    Plan of Care Reviewed With patient  -KW     Progress improving  -KW     Outcome Evaluation Physical therapy treatment complete. Patient increased ambulation distance compared to previous treatment session. She is able to ambulate 300 with SBA and rwx. Patient's R hip pain/ weakness limits gait distance and speed. Patient returned to room and completed exercises in chair. Patient with weakness in R LE and unable to complete LAQ on R LE due to weakness and pain in R knee. She is unable to Lift R LE for transitions and required use of leg . She would benefit from OP PT services to improve strength and balance. The patient continues to present below baseline for functional mobility. The patient would continue to benefit from skilled PT to address strength, balance and activity tolerance deficits. Continue to current PT POC  -KW       Row Name 01/03/25 0910          Therapy Assessment/Plan (PT)    Patient/Family Therapy Goals Statement (PT) to return to baseline  -KW     Predicted Duration of Therapy Intervention (PT) 10 days  -KW       Row Name 01/03/25 0910          Vital Signs    Pre Systolic BP Rehab 136  -KW     Pre Treatment Diastolic BP 78  -KW     Posttreatment Heart Rate (beats/min) 81  -KW       Row Name 01/03/25 0910          Positioning and Restraints    Pre-Treatment Position standing in room  -KW     Post Treatment Position chair  -KW     In Chair reclined;call light within reach;encouraged to call for assist;legs  elevated  -KW               User Key  (r) = Recorded By, (t) = Taken By, (c) = Cosigned By      Initials Name Provider Type    Irina Romero PT Physical Therapist                   Outcome Measures       Row Name 01/03/25 0910          How much help from another person do you currently need...    Turning from your back to your side while in flat bed without using bedrails? 4  -KW     Moving from lying on back to sitting on the side of a flat bed without bedrails? 4  -KW     Moving to and from a bed to a chair (including a wheelchair)? 3  -KW     Standing up from a chair using your arms (e.g., wheelchair, bedside chair)? 3  -KW     Climbing 3-5 steps with a railing? 3  -KW     To walk in hospital room? 3  -KW     AM-PAC 6 Clicks Score (PT) 20  -KW     Highest Level of Mobility Goal 6 --> Walk 10 steps or more  -KW       Row Name 01/03/25 0910          Functional Assessment    Outcome Measure Options AM-PAC 6 Clicks Basic Mobility (PT)  -KW               User Key  (r) = Recorded By, (t) = Taken By, (c) = Cosigned By      Initials Name Provider Type    Irina Romero PT Physical Therapist                                 Physical Therapy Education       Title: PT OT SLP Therapies (In Progress)       Topic: Physical Therapy (In Progress)       Point: Mobility training (Done)       Learning Progress Summary            Patient Acceptance, E, VU by MILA at 1/1/2025 0824    Acceptance, E, VU,NR by MILA at 12/29/2024 0930                      Point: Home exercise program (Not Started)       Learner Progress:  Not documented in this visit.              Point: Body mechanics (Not Started)       Learner Progress:  Not documented in this visit.              Point: Precautions (Not Started)       Learner Progress:  Not documented in this visit.                              User Key       Initials Effective Dates Name Provider Type Rodrick ZARAGOZA 07/11/23 -  Mai Toney, PT Physical Therapist PT                   PT Recommendation and Plan     Progress: improving  Outcome Evaluation: Physical therapy treatment complete. Patient increased ambulation distance compared to previous treatment session. She is able to ambulate 300 with SBA and rwx. Patient's R hip pain/ weakness limits gait distance and speed. Patient returned to room and completed exercises in chair. Patient with weakness in R LE and unable to complete LAQ on R LE due to weakness and pain in R knee. She is unable to Lift R LE for transitions and required use of leg . She would benefit from OP PT services to improve strength and balance. The patient continues to present below baseline for functional mobility. The patient would continue to benefit from skilled PT to address strength, balance and activity tolerance deficits. Continue to current PT POC     Time Calculation:         PT Charges       Row Name 01/03/25 0910             Time Calculation    Start Time 0910  -KW      PT Received On 01/03/25  -KW         Timed Charges    32413 - PT Therapeutic Exercise Minutes 11  -KW      58102 - Gait Training Minutes  16  -KW         Total Minutes    Timed Charges Total Minutes 27  -KW       Total Minutes 27  -KW                User Key  (r) = Recorded By, (t) = Taken By, (c) = Cosigned By      Initials Name Provider Type    Irina Romero PT Physical Therapist                  Therapy Charges for Today       Code Description Service Date Service Provider Modifiers Qty    72736705068 HC GAIT TRAINING EA 15 MIN 1/3/2025 Irina Matthews PT GP 1    15522108591 HC PT THER PROC EA 15 MIN 1/3/2025 Irina Matthews PT GP 1            PT G-Codes  Outcome Measure Options: AM-PAC 6 Clicks Basic Mobility (PT)  AM-PAC 6 Clicks Score (PT): 20  AM-PAC 6 Clicks Score (OT): 19  PT Discharge Summary  Anticipated Discharge Disposition (PT): home with outpatient therapy services    Irina Matthews PT  1/3/2025

## 2025-01-03 NOTE — CASE MANAGEMENT/SOCIAL WORK
Continued Stay Note  [facility]     Patient Name: Oralia Woodruff  MRN: [id number]  Today's Date: 1/3/2025    Admit Date: 12/27/2024    Plan: Home with spouse & outpatient therapy   Discharge Plan       Row Name 01/03/25 1119       Plan    Plan Home with spouse & outpatient therapy    Patient/Family in Agreement with Plan yes    Plan Comments Discussed patient in MDR. Probable discharge today. CM spoke to patient at bedside and delivered IMM. Her plan remains home with her spouse and outpatient PT. Order in Epic. Printed and given to patient to arrange at her convenience per her request. Rollator & bedside commode order in Epic. Called to Izaiah with [employer]. [employer] will deliver to bedside. Spouse or daughter will transport per patient. Patient preferred to have equipment delivered to bedside instead of a local company delivering to her home, in case she goes home with her daughter. PT recommends outpatient PT. Patient ambulated 300 feet with SBA and RW today. Patient denies any other discharge needs at this time. CM will continue to follow.    Final Discharge Disposition Code 01 - home or self-care                   Discharge Codes    No documentation.                 Expected Discharge Date and Time       Expected Discharge Date Expected Discharge Time    Dec 31, 2024               Ashlie Blankenship RN

## 2025-01-03 NOTE — PROGRESS NOTES
" LOS: 6 days   Patient Care Team:  Yanni Olsen MD as PCP - General (Family Medicine)    Chief Complaint: Hypercalcemia     Subjective     Doing well wants to go home. Total ca improve. Renal function still above baseline. Good po intake.     Subjective    History taken from: patient family    Objective     Vital Sign Min/Max for last 24 hours  Temp  Min: 96.5 °F (35.8 °C)  Max: 97.9 °F (36.6 °C)   BP  Min: 136/78  Max: 152/78   Pulse  Min: 74  Max: 104   Resp  Min: 16  Max: 18   SpO2  Min: 95 %  Max: 100 %   No data recorded   No data recorded     Flowsheet Rows      Flowsheet Row First Filed Value   Admission Height 162.6 cm (64\") Documented at 12/27/2024 1211   Admission Weight 49.9 kg (110 lb) Documented at 12/27/2024 1211            I/O this shift:  In: 240 [P.O.:240]  Out: -   I/O last 3 completed shifts:  In: 240 [P.O.:240]  Out: -     Objective:  General Appearance:  Comfortable.    Vital signs: (most recent): Blood pressure 136/78, pulse 104, temperature 96.5 °F (35.8 °C), temperature source Oral, resp. rate 18, height 162.6 cm (64\"), weight 49.9 kg (110 lb), SpO2 100%, not currently breastfeeding.  Vital signs are normal.    Output: Producing urine.    HEENT: Normal HEENT exam.    Lungs:  Normal effort and normal respiratory rate.  Breath sounds clear to auscultation.  She is not in respiratory distress.  No decreased breath sounds or wheezes.    Heart: Normal rate.  Regular rhythm.  S1 normal and S2 normal.  No murmur or gallop.   Abdomen: Abdomen is soft.  Bowel sounds are normal.     Extremities: Normal range of motion.  There is no dependent edema or local swelling.    Pulses: Distal pulses are intact.    Neurological: Patient is alert and oriented to person, place and time.  Normal strength.    Pupils:  Pupils are equal, round, and reactive to light.    Skin:  Warm.                Results Review:     I reviewed the patient's new clinical results.    WBC No results found for: \"WBC\"   HGB No " "results found for: \"HGB\"   HCT No results found for: \"HCT\"   Platlets No results found for: \"LABPLAT\"   MCV No results found for: \"MCV\"       Sodium Sodium   Date Value Ref Range Status   01/03/2025 139 136 - 145 mmol/L Final   01/01/2025 138 136 - 145 mmol/L Final      Potassium Potassium   Date Value Ref Range Status   01/03/2025 4.6 3.5 - 5.2 mmol/L Final   01/01/2025 4.5 3.5 - 5.2 mmol/L Final      Chloride Chloride   Date Value Ref Range Status   01/03/2025 112 (H) 98 - 107 mmol/L Final   01/01/2025 109 (H) 98 - 107 mmol/L Final      CO2 CO2   Date Value Ref Range Status   01/03/2025 19.0 (L) 22.0 - 29.0 mmol/L Final   01/01/2025 18.0 (L) 22.0 - 29.0 mmol/L Final      BUN BUN   Date Value Ref Range Status   01/03/2025 29 (H) 8 - 23 mg/dL Final   01/01/2025 24 (H) 8 - 23 mg/dL Final      Creatinine Creatinine   Date Value Ref Range Status   01/03/2025 1.42 (H) 0.57 - 1.00 mg/dL Final   01/01/2025 1.35 (H) 0.57 - 1.00 mg/dL Final      Calcium Calcium   Date Value Ref Range Status   01/03/2025 8.4 (L) 8.6 - 10.5 mg/dL Final   01/01/2025 7.8 (L) 8.6 - 10.5 mg/dL Final      PO4 No results found for: \"CAPO4\"   Albumin No results found for: \"ALBUMIN\"   Magnesium Magnesium   Date Value Ref Range Status   01/01/2025 2.2 1.6 - 2.4 mg/dL Final      Uric Acid No results found for: \"URICACID\"     Medication Review: Yes    Assessment & Plan       Pancreatic mass    Moderate protein-calorie malnutrition      Assessment & Plan  Expand All Collapse All       Referring Provider: Kyle Chowdhury   Reason for Consultation: XAVIER and Hypercalcemia         Subjective  Chief complaint weight loss      History of present illness: This is a 79 y.o. female with history of hypertension admitted for failure to thrive and significant weight loss.  She was brought in by her daughter who is visiting over the holiday. She was found to have elevated calcium and abnormal renal function. CT abdomen/pelvis concerning for a cystic lesion of the " pancreas neoplasm.  Denies any fever, chills, rigors, rash, hematuria or hemoptysis. No family hx of kidney disease or autoimmune disease. Denies any hx of kidney stones. She was taking some NSAID on an off recently. Nephrology service has been consulted for XAVIER and Hypercalcemia.         History  Medical History        Past Medical History:   Diagnosis Date    Hypertension        ,   Surgical History         Past Surgical History:   Procedure Laterality Date    CHOLECYSTECTOMY        EYE SURGERY        HYSTERECTOMY          , History reviewed. No pertinent family history.,   Social History   Social History           Socioeconomic History    Marital status:    Tobacco Use    Smoking status: Never    Smokeless tobacco: Never   Vaping Use    Vaping status: Never Used   Substance and Sexual Activity    Alcohol use: No    Drug use: No    Sexual activity: Defer               E-cigarette/Vaping    E-cigarette/Vaping Use Never User      Passive Exposure No      Counseling Given No              E-cigarette/Vaping Substances    Nicotine No      THC No      CBD No      Flavoring No              E-cigarette/Vaping Devices    Disposable No      Pre-filled or Refillable Cartridge No      Refillable Tank No      Pre-filled Pod No              ,   Prescriptions Prior to Admission           Medications Prior to Admission   Medication Sig Dispense Refill Last Dose/Taking    losartan (COZAAR) 25 MG tablet Take 2 tablets by mouth 2 (Two) Times a Day. 60 tablet 0      MULTIPLE VITAMINS-MINERALS PO Take  by mouth Daily.            , Scheduled Meds:  apixaban, 2.5 mg, Oral, Q12H  senna-docusate sodium, 2 tablet, Oral, BID  sodium chloride, 10 mL, Intravenous, Q12H   , Continuous Infusions:    Infusion Medications       , PRN Meds:    acetaminophen **OR** acetaminophen **OR** acetaminophen    senna-docusate sodium **AND** polyethylene glycol **AND** bisacodyl **AND** bisacodyl    Calcium Replacement - Follow Nurse / BPA Driven  Protocol    Magnesium Standard Dose Replacement - Follow Nurse / BPA Driven Protocol    nitroglycerin    ondansetron    Phosphorus Replacement - Follow Nurse / BPA Driven Protocol    Potassium Replacement - Follow Nurse / BPA Driven Protocol    Sodium Chloride (PF)    sodium chloride    sodium chloride    traMADol, and Allergies:  Patient has no active allergies.     Review of Systems  Pertinent items are noted in HPI        Objective  Vital Signs  Temp:  [97.5 °F (36.4 °C)-98.5 °F (36.9 °C)] 97.5 °F (36.4 °C)  Heart Rate:  [71-80] 71  Resp:  [18] 18  BP: (128-147)/(65-73) 147/73     I/O this shift:  In: 240 [P.O.:240]  Out: -   I/O last 3 completed shifts:  In: 1389.6 [P.O.:240; I.V.:899.6; IV Piggyback:250]  Out: -      Physical Exam:  General Appearance:    Alert, cooperative, in no acute distress   Head:    Normocephalic, without obvious abnormality, atraumatic   Eyes:            Conjunctivae and sclerae normal, no   icterus, no pallor, corneas clear, PERRLA               Neck:   Supple, trachea midline, no thyromegaly,  no JVD         Lungs:     Clear to auscultation,respirations regular, even and               unlabored    Heart:    Regular rhythm and normal rate, normal S1 and S2, no       murmur, no gallop, no rub, no click         Abdomen:     Normal bowel sounds,soft, non-tender, non-distended, no guarding, no rebound tenderness         Extremities:   Moves all extremities well, no edema, no cyanosis, no         redness   Pulses:   Pulses palpable and equal bilaterally               Neurologic:   Cranial nerves 2 - 12 grossly intact, no focal deficit            Results Review:              I reviewed the patient's new clinical results.     WBC       WBC   Date Value Ref Range Status   12/31/2024 5.95 3.40 - 10.80 10*3/mm3 Final      HGB       Hemoglobin   Date Value Ref Range Status   12/31/2024 9.9 (L) 12.0 - 15.9 g/dL Final      HCT       Hematocrit   Date Value Ref Range Status   12/31/2024 31.8 (L)  "34.0 - 46.6 % Final      Platlets No results found for: \"LABPLAT\"   MCV       MCV   Date Value Ref Range Status   12/31/2024 94.9 79.0 - 97.0 fL Final            Sodium       Sodium   Date Value Ref Range Status   01/01/2025 138 136 - 145 mmol/L Final   12/31/2024 139 136 - 145 mmol/L Final      Potassium       Potassium   Date Value Ref Range Status   01/01/2025 4.5 3.5 - 5.2 mmol/L Final   12/31/2024 4.6 3.5 - 5.2 mmol/L Final      Chloride       Chloride   Date Value Ref Range Status   01/01/2025 109 (H) 98 - 107 mmol/L Final   12/31/2024 110 (H) 98 - 107 mmol/L Final      CO2       CO2   Date Value Ref Range Status   01/01/2025 18.0 (L) 22.0 - 29.0 mmol/L Final   12/31/2024 19.0 (L) 22.0 - 29.0 mmol/L Final      BUN       BUN   Date Value Ref Range Status   01/01/2025 24 (H) 8 - 23 mg/dL Final   12/31/2024 30 (H) 8 - 23 mg/dL Final      Creatinine       Creatinine   Date Value Ref Range Status   01/01/2025 1.35 (H) 0.57 - 1.00 mg/dL Final   12/31/2024 1.45 (H) 0.57 - 1.00 mg/dL Final      Calcium       Calcium   Date Value Ref Range Status   01/01/2025 7.8 (L) 8.6 - 10.5 mg/dL Final   12/31/2024 8.3 (L) 8.6 - 10.5 mg/dL Final      PO4 No results found for: \"CAPO4\"   Albumin No results found for: \"ALBUMIN\"   Magnesium       Magnesium   Date Value Ref Range Status   01/01/2025 2.2 1.6 - 2.4 mg/dL Final      Uric Acid No results found for: \"URICACID\"                   Results from last 7 days   Lab Units 01/01/25  0555 12/31/24  0610 12/31/24  0609 12/30/24  0335 12/29/24  1105 12/28/24  2144 12/28/24  2140 12/27/24  1642 12/27/24  1638   SODIUM mmol/L 138  --  139 139 140  --   --    < > 135*   POTASSIUM mmol/L 4.5  --  4.6 4.1 4.6  --   --    < > 4.7   CHLORIDE mmol/L 109*  --  110* 109* 110*  --   --    < > 105   CO2 mmol/L 18.0*  --  19.0* 21.0* 20.0*  --   --    < > 20.0*   BUN mg/dL 24*  --  30* 34* 30*  --   --    < > 39*   CREATININE mg/dL 1.35*  --  1.45* 1.55* 1.70*  --   --    < > 1.60*   CALCIUM mg/dL " 7.8*  --  8.3* 9.3 11.2*  --   --    < > 12.6*   ALBUMIN g/dL  --   --   --   --  3.4* 3.2  --   --  3.9   WBC 10*3/mm3  --  5.95  --  5.61 4.28  --  5.62   < >  --    HEMOGLOBIN g/dL  --  9.9*  --  9.3* 10.2*  --  9.8*   < >  --    PLATELETS 10*3/mm3  --  186  --  183 195  --  193   < >  --     < > = values in this interval not displayed.                    Scheduled Medication   apixaban, 2.5 mg, Oral, Q12H  senna-docusate sodium, 2 tablet, Oral, BID  sodium chloride, 10 mL, Intravenous, Q12H         Infusion Medications               Assessment & Plan    Pancreatic mass    Moderate protein-calorie malnutrition        1- XAVIER vs CKD - unknown baseline - Scr at presentation 1.7. Pre-renal azotemia - improving with hydration. On losartan at home. She took some NSAID recently before admission.   2- Hypercalcemia - Calcium 12.6 at presentation s/p Zometa 12/28 - resolved.  PTH elevated SIEP no M spike. Thyroid ultrasound suggestive of adenoma   3- Anemia   4- mild metabolic acidosis   5- DVT   6- Pancreatic cystic lesion concerning of malignancy      Plan:  -  Discussed with patient about option for sensipar for medical management of hyperparathyroidism. She is agreeable. Will start sensipar 30 mg daily.   F/u in renal clinic in 2 weeks. Will do televisit   Outpatient sestamibi scan.          Tee Lindquist MD  01/03/25  16:17 EST

## 2025-01-03 NOTE — PROGRESS NOTES
Patient has been initiated on Apixaban (Eliquis) during admission. Education provided on 1/3/2025 verbally and in writing. Information provided includes effects of medication, drug-drug and drug-food interactions, and signs/symptoms of bleeding and clotting. Patient/family verbalized understanding through teach back. All pertinent questions were answered by pharmacist.    Thank you,  Nicki Mahoney, PharmD  01/03/25  13:27 EST

## 2025-01-03 NOTE — CASE MANAGEMENT/SOCIAL WORK
Case Management Discharge Note      Final Note: Probable discharge today. CM spoke to Sydney with [facility], [place] to discuss Cinacalcet 30 mg, 30 day supply with Good RX coupon card. Per Sydney, they have Cinacalcet 30 mg, 30 day supply in-stock and it is giving her a vences of $23.28. Sydney stated that once the script is sent to them and they run the coupon, occasionally it may be a few dollars more. Updated patient, spouse and daughter at bedside. They are agreeable to cost and to pick-up RX at [facility]. Updated Dr. Chowdhury and Dr. Lindquist. IMM was delivered this morning. Her plan remains home with her spouse and outpatient PT. Order in Epic. Printed and given to patient to arrange at her convenience per her request. [facility] bedside commode order in Epic. Called to Izaiah with [facility]. [facility] will deliver to bedside. Spouse will transport. PT recommends outpatient PT. Patient ambulated 300 feet with SBA and RW today. No other needs noted.         Selected Continued Care - Admitted Since 12/27/2024       Destination    No services have been selected for the patient.                Durable Medical Equipment       Service Provider Services Address Phone Fax Patient Preferred    [facility] [address] [facility] [place] [id number] [phone] --       Internal Comment last updated by Ashlie Blankenship, RN 1/3/2025 6630    [facility]                         Dialysis/Infusion    No services have been selected for the patient.                Home Medical Care    No services have been selected for the patient.                Therapy    No services have been selected for the patient.                Community Resources    No services have been selected for the patient.                Community & DME    No services have been selected for the patient.                    Transportation Services  Private: Car    Final Discharge Disposition Code: 01 - home or  self-care

## 2025-01-03 NOTE — TELEPHONE ENCOUNTER
Caller: [facility] TONY    Relationship to patient:     Best call back number: [phone]     New or established patient?  [x] New  [] Established    Date of discharge: 01.03.25    Facility discharged from: [facility]  Diagnosis/Symptoms: PANCREATIC MASS    Length of stay (If applicable):     Specialty Only: Did you see a Religion health provider?    [] Yes  [] No  If so, who?     Additional Details:   tion.

## 2025-01-03 NOTE — PLAN OF CARE
Goal Outcome Evaluation:  Plan of Care Reviewed With: patient        Progress: improving  Outcome Evaluation: Physical therapy treatment complete. Patient increased ambulation distance compared to previous treatment session. She is able to ambulate 300 with SBA and rwx. Patient's R hip pain/ weakness limits gait distance and speed. Patient returned to room and completed exercises in chair. Patient with weakness in R LE and unable to complete LAQ on R LE due to weakness and pain in R knee. She is unable to Lift R LE for transitions and required use of leg . She would benefit from OP PT services to improve strength and balance. The patient continues to present below baseline for functional mobility. The patient would continue to benefit from skilled PT to address strength, balance and activity tolerance deficits. Continue to current PT POC    Anticipated Discharge Disposition (PT): home with outpatient therapy services                         199.9

## 2025-01-04 NOTE — OUTREACH NOTE
Prep Survey      Flowsheet Row Responses   [facility] patient discharged from? [place]   Is LACE score < 7 ? Yes   Eligibility [facility]   Date of Admission 12/27/24   Date of Discharge 01/03/25   Discharge diagnosis Pancreatic mass   Does the patient have one of the following disease processes/diagnoses(primary or secondary)? Other   Is there a DME ordered? Yes   What DME was ordered? [facility]   Prep survey completed? Yes            Sanjuana MONTEIRO - Registered Nurse

## 2025-01-05 LAB — PTH RELATED PROT SERPL-SCNC: <2 PMOL/L

## 2025-01-06 ENCOUNTER — TRANSITIONAL CARE MANAGEMENT TELEPHONE ENCOUNTER (OUTPATIENT)
Dept: CALL CENTER | Facility: HOSPITAL | Age: 80
End: 2025-01-06
Payer: MEDICARE

## 2025-01-06 NOTE — OUTREACH NOTE
Call Center TCM Note      Flowsheet Row Responses   [facility] patient discharged from? [place]   Does the patient have one of the following disease processes/diagnoses(primary or secondary)? Other   TCM attempt successful? Yes   Call start time 1237   Call Status Left message  [no voice mail option.]   Revoked Reason --  [non  PCP]   Call end time 1247   Discharge diagnosis Pancreatic mass   Person spoke with today (if not patient) and relationship daughter   Meds reviewed with patient/caregiver? Yes   Is the patient having any side effects they believe may be caused by any medication additions or changes? No   Does the patient have all medications ordered at discharge? Yes   Is the patient taking all medications as directed (includes completed medication regime)? Yes   Comments Patient needs a followup scheduled next week as a new patient appt. They prefer to come to [facility]  [place] .  She will also need blood work. Message sent to office to schedule.   Does the patient have an appointment with their PCP within 7-14 days of discharge? No   Nursing Interventions Routed TCM call to PCP office, PCP office requested to make appointment - message sent   What DME was ordered? [employer] [facility]   Has all DME been delivered? Yes   Psychosocial issues? No   Did the patient receive a copy of their discharge instructions? Yes   Nursing interventions Reviewed instructions with patient   What is the patient's perception of their health status since discharge? Improving   Is the patient/caregiver able to teach back signs and symptoms related to disease process for when to call PCP? Yes   Is the patient/caregiver able to teach back signs and symptoms related to disease process for when to call 911? Yes   Is the patient/caregiver able to teach back the hierarchy of who to call/visit for symptoms/problems? PCP, Specialist, Home health nurse, Urgent Care, ED, 911 Yes   If the patient is a current smoker, are they able to  teach back resources for cessation? Not a smoker   TCM call completed? Yes   Call end time 1240   Would this patient benefit from a Referral to [facility] Social Work? No   Is the patient interested in additional calls from an ambulatory ? No            Mauricio Carrizales RN    1/6/2025, 12:47 EST

## 2025-01-06 NOTE — PROGRESS NOTES
Called patient, she wants to think about what provider to see. She stated she will call back to schedule

## 2025-01-09 NOTE — DISCHARGE SUMMARY
[facility] Medicine Services  DISCHARGE SUMMARY    Patient Name: Oralia Woodruff  : 1945  MRN: [id number]    Date of Admission: 2024  4:35 PM  Date of Discharge:    Primary Care Physician: Yanni Olsen MD    Consults       Date and Time Order Name Status Description    2025 12:50 PM Inpatient Nephrology Consult Completed     2025  4:17 PM Inpatient General Surgery Consult Completed     2024 10:31 PM Inpatient Hematology & Oncology Consult Completed             Hospital Course     Presenting Problem: Debility, weight loss, abdominal pain    Active Hospital Problems    Diagnosis  POA    **Pancreatic mass [K86.89]  Yes    Moderate protein-calorie malnutrition [E44.0]  Yes      Resolved Hospital Problems   No resolved problems to display.          Hospital Course:  Oralia Woodruff is a 79 y.o. female  with history of hypertension admitted for failure to thrive and significant weight loss.  She was brought in by her daughter who is visiting over the holiday.  CT abdomen/pelvis concerning for a cystic lesion of the pancreas neoplasm. Workup in progress. Oncology following     Abdominal pain  Weight loss  Pancreatic cystic lesion  -Concern for malignancy, oncology consulted  -CA 19-9 elevated but very mildly  -Bone scan shows diffuse uptake very compatible with elevated parathyroid hormone     Mild hypercalcemia  Very elevated parathyroid hormone  -Hypercalcemia improved with IVF  -S/p Zometa times 1 on   -Thyroid ultrasound was done which showed a nodule very compatible with parathyroid adenoma.    -Surgery has seen and evaluated the patient and talked with the patient.  Patient can follow-up with Dr. Clancy as outpatient.  -Recommended to get nuclear medicine thyroid scan but patient declined and said she would rather have it as an outpatient later.  -I had multiple discussion with the patient almost every day and explained the situation to her.   I discussed options to her also.  Consulted nephrology and they also saw and evaluated patient and patient is supposed to follow-up with nephrology as an outpatient.  Patient was started on Cinacalcet and she was prescribed the same medication and the prescription was sent to [facility] which actually with the cheapest.  Dr. Clancy with general surgery also saw the patient.  Patient is supposed to follow-up with Dr. Clancy as an outpatient to discuss possible surgical option.     RLE pain  R LE and LLE DVT  -Acute right lower extremity deep vein thrombosis noted in the popliteal and peroneal.  - Acute left lower extremity deep vein thrombosis noted in the soleal  -heparin drip started 12/28  -Patient was switched to Eliquis which will be continued for discharge     Renal insufficiency  Unsure of baseline CKD  -Nephrology also has seen and evaluated patient.     Anemia, normocytic      Discharge Follow Up Recommendations for outpatient labs/diagnostics:       Day of Discharge     HPI:   Resting in bed in no acute distress.  Feels a little tired because she cannot rest in the hospital but otherwise she feels okay.  She is very very eager to go home.  No fever or chills.  No chest pain palpitation shortness of breath.  No nausea or vomiting.    Review of Systems  As above    Vital Signs:          Physical Exam:  Constitutional: No acute distress, awake, alert  HENT: NCAT, mucous membranes moist  Respiratory: Clear to auscultation bilaterally, respiratory effort normal  GI: Abdomen is soft, not tender, not distended, bowel sounds are present  Musculoskeletal:   edema of lower extremities bilaterally.  Psychiatric: Appropriate affect, cooperative  Neurologic: Oriented x 3, no focality appreciated, speech clear  Skin: No rashes    Pertinent  and/or Most Recent Results     LAB RESULTS:      Lab 01/02/25  0333   HEPARIN ANTI-XA 0.50         Lab 01/03/25  1058 01/02/25  0333   SODIUM 139  --    POTASSIUM 4.6  --     CHLORIDE 112*  --    CO2 19.0*  --    ANION GAP 8.0  --    BUN 29*  --    CREATININE 1.42*  --    EGFR 37.5*  --    GLUCOSE 130*  --    CALCIUM 8.4*  --    IONIZED CALCIUM 1.11*  --    PHOSPHORUS  --  3.3                         Brief Urine Lab Results  (Last result in the past 365 days)        Color   Clarity   Blood   Leuk Est   Nitrite   Protein   CREAT   Urine HCG        12/27/24 1518 Yellow   Clear   Negative   Negative   Negative   30 mg/dL (1+)                 Microbiology Results (last 10 days)       ** No results found for the last 240 hours. **            US Thyroid    Result Date: 12/31/2024  US THYROID Date of Exam: 12/31/2024 3:54 PM EST Indication: very elevated parathyroid hormone, elevated serum calcium.. Comparison: No comparisons available. Technique: Grayscale and color and Doppler ultrasound imaging of the thyroid performed according to routine thyroid ultrasound protocol, real time with image documentation. Findings: Thyroid appears lobular and enlarged overall. The right thyroid lobe measures 4.2 x 2.1 x 2.1 cm. Left lobe measures 4.8 x 2.6 x 2.3 cm. Thyroid isthmus measures 1.2 cm in width. Echotexture of the thyroid is generally heterogeneous and irregularly nodular. There is a single, approximately 0.5 x 0.5 x 0.8 cm well-defined isoechoic nodule of the right thyroid upper-mid pole with surrounding complete hypoechoic halo, typical for benign thyroid nodule. No other intra parenchymal thyroid lesions are seen. Along the dorsal lower mid pole region of the right thyroid lobe, there is a sharply defined, markedly hypoechoic nodule questionably partially cystic, generally with reniform shape, 2.4 x 1.2 x 1.4 cm in diameter with a small amount of internal color Doppler flow. Appearance is fairly typical for a parathyroid adenoma. No other nodule or cyst is seen at the level of the scan.     Impression: 1. Large heterogeneous thyroid gland, consistent with goiter. 2. Single 0.5 x 0.5 x 8 mm  isoechoic thyroid nodule, TI RADS category 3, with no need for follow-up by TI-RADS criteria. 3. Sharply-defined, markedly hypoechoic, but mostly solid appearing nodule along the dorsal mid and inferior portion of the right thyroid lobe, features considered typical of parathyroid adenoma. TI-RADS: TR3 - Size less than 1.5 cm. No follow up needed. TI RADS recommendations pertain to the intrinsic thyroid nodule only. Electronically Signed: Jeet Simon MD  12/31/2024 4:44 PM EST  Workstation ID: [id number]    NM Bone Scan Whole Body    Result Date: 12/30/2024  DATE OF EXAM: 12/30/2024 10:47 AM EST PROCEDURE: NM BONE SCAN WHOLE BODY INDICATIONS: Back pain with hypercalcemia COMPARISON: CT CAP 12/27/2024 TECHNIQUE: The patient received 29.5 mCi of technetium 99m MDP intravenously and 3 hour delayed anterior and posterior whole body bone images were obtained. FINDINGS: Diffuse increased uptake throughout the skeleton is consistent with so-called SuperScan pattern. No renal activity is evident. The differential diagnosis includes metabolic bone disease, renal osteodystrophy, hyperparathyroidism, hyperthyroidism, and diffuse neoplastic marrow involvement. Uptake in the maxilla and mandible is probably odontogenic. Uptake in posterior upper right ribs is consistent with healing fracture. Uptake in the shoulders, elbows, knees, ankles, and feet is consistent with degenerative change. Multifocal uptake in the spine is consistent with degenerative change.     Total body bone scan demonstrating diffuse increased skeletal uptake, as above. Multifocal uptake in the axial and appendicular skeleton is consistent with degenerative change. Electronically Signed: Agustín Tafoya MD  12/30/2024 5:44 PM EST  Workstation ID: [id number]     Results for orders placed during the hospital encounter of 12/27/24    Duplex Venous Lower Extremity - Bilateral CAR    Interpretation Summary    Acute right lower extremity deep vein thrombosis noted in the  popliteal and peroneal.    Acute left lower extremity deep vein thrombosis noted in the soleal.    All other veins appeared normal bilaterally.      Results for orders placed during the hospital encounter of 12/27/24    Duplex Venous Lower Extremity - Bilateral CAR    Interpretation Summary    Acute right lower extremity deep vein thrombosis noted in the popliteal and peroneal.    Acute left lower extremity deep vein thrombosis noted in the soleal.    All other veins appeared normal bilaterally.      Results for orders placed during the hospital encounter of 12/27/24    Adult Transthoracic Echo Complete W/ Cont if Necessary Per Protocol    Interpretation Summary    Left ventricular systolic function is normal. Calculated left ventricular EF = 59.2% Left ventricular ejection fraction appears to be 56 - 60%.    Left ventricular diastolic function was normal.      Plan for Follow-up of Pending Labs/Results:     Discharge Details        Discharge Medications        New Medications        Instructions Start Date   apixaban 2.5 MG tablet tablet  Commonly known as: ELIQUIS   2.5 mg, Oral, Every 12 Hours Scheduled      cinacalcet 30 MG tablet  Commonly known as: SENSIPAR   30 mg, Oral, Daily With Breakfast             Continue These Medications        Instructions Start Date   losartan 25 MG tablet  Commonly known as: COZAAR   50 mg, Oral, 2 Times Daily      multivitamin with minerals tablet tablet   Oral, Daily               No Active Allergies      Discharge Disposition:  Home or Self Care    Diet:  Hospital:No active diet order      Diet Instructions       Diet: Regular/House Diet; Thin (IDDSI 0)      Discharge Diet: Regular/House Diet    Fluid Consistency: Thin (IDDSI 0)             Activity:  Activity Instructions       Activity as Tolerated              Restrictions or Other Recommendations:         CODE STATUS:    Code Status and Medical Interventions: CPR (Attempt to Resuscitate); Full Support   Ordered at: 12/27/24  2231     Code Status (Patient has no pulse and is not breathing):    CPR (Attempt to Resuscitate)     Medical Interventions (Patient has pulse or is breathing):    Full Support       No future appointments.    Additional Instructions for the Follow-ups that You Need to Schedule       Ambulatory Referral to Physical Therapy for Evaluation & Treatment   As directed      Specialty needed: Evaluate and treat   Weight Bearing Status: Full weight bearing   Follow-up needed: Yes        Discharge Follow-up with PCP   As directed       Currently Documented PCP:    Yanni Olsen MD    PCP Phone Number:    [phone]     Follow Up Details: within one week        Discharge Follow-up with Specified Provider: Dr. Rodríguez as per schedule   As directed      To: Dr. Rodríguez as per schedule        Discharge Follow-up with Specified Provider: nephrology as per schedule.   As directed      To: nephrology as per schedule.                      Kyle Chowdhury MD  01/08/25      Time Spent on Discharge:  I spent  45  minutes on this discharge activity which included: face-to-face encounter with the patient, reviewing the data in the system, coordination of the care with the nursing staff as well as consultants, documentation, and entering orders.

## 2025-07-22 ENCOUNTER — DOCUMENTATION (OUTPATIENT)
Dept: FAMILY MEDICINE CLINIC | Facility: CLINIC | Age: 80
End: 2025-07-22
Payer: MEDICARE

## 2025-07-22 RX ORDER — ACETAMINOPHEN 160 MG/5ML
1000 SOLUTION ORAL EVERY 6 HOURS PRN
COMMUNITY

## 2025-07-22 RX ORDER — BENZONATATE 200 MG/1
200 CAPSULE ORAL 2 TIMES DAILY PRN
COMMUNITY

## 2025-07-22 RX ORDER — AZELASTINE HYDROCHLORIDE 137 UG/1
2 SPRAY, METERED NASAL 2 TIMES DAILY
COMMUNITY

## 2025-07-22 RX ORDER — LEVOTHYROXINE SODIUM 125 UG/1
125 TABLET ORAL
COMMUNITY

## 2025-07-22 RX ORDER — ONDANSETRON 4 MG/1
4 TABLET, FILM COATED ORAL EVERY 6 HOURS PRN
COMMUNITY

## 2025-07-22 RX ORDER — MULTIPLE VITAMINS W/ MINERALS TAB 9MG-400MCG
1 TAB ORAL DAILY
COMMUNITY

## 2025-07-22 RX ORDER — DESMOPRESSIN ACETATE 0.1 MG/1
0.1 TABLET ORAL 2 TIMES DAILY
COMMUNITY

## 2025-07-22 RX ORDER — FLUORIDE TOOTHPASTE
30 TOOTHPASTE DENTAL 2 TIMES DAILY
COMMUNITY

## 2025-07-22 RX ORDER — ACETAMINOPHEN 500 MG
500 TABLET ORAL EVERY 6 HOURS PRN
COMMUNITY

## 2025-07-22 RX ORDER — POLYETHYLENE GLYCOL 3350 17 G/17G
17 POWDER, FOR SOLUTION ORAL DAILY
COMMUNITY

## 2025-07-22 RX ORDER — HYDROCORTISONE 5 MG/1
5 TABLET ORAL DAILY
COMMUNITY

## 2025-07-22 RX ORDER — METHOCARBAMOL 500 MG/1
500 TABLET, FILM COATED ORAL 3 TIMES DAILY PRN
COMMUNITY

## 2025-07-22 RX ORDER — SERTRALINE HYDROCHLORIDE 25 MG/1
25 TABLET, FILM COATED ORAL DAILY
COMMUNITY

## 2025-07-22 RX ORDER — NYSTATIN 100000 U/G
1 OINTMENT TOPICAL 3 TIMES DAILY PRN
COMMUNITY

## 2025-07-22 RX ORDER — OXYCODONE HYDROCHLORIDE 5 MG/1
5 TABLET ORAL EVERY 6 HOURS PRN
COMMUNITY

## 2025-07-22 RX ORDER — MUPIROCIN 2 %
1 OINTMENT (GRAM) TOPICAL 2 TIMES DAILY
COMMUNITY

## 2025-07-22 RX ORDER — OXYBUTYNIN CHLORIDE 5 MG/5ML
10 SYRUP ORAL 3 TIMES DAILY
COMMUNITY

## 2025-07-22 RX ORDER — DOCUSATE SODIUM 50 MG/5ML
100 LIQUID ORAL DAILY
COMMUNITY

## 2025-07-22 RX ORDER — ECHINACEA PURPUREA EXTRACT 125 MG
1 TABLET ORAL
COMMUNITY

## 2025-07-30 NOTE — PROGRESS NOTES
Nursing Home History and Physical       Gerry Ruth DO  [address] [place] [address] Phone: [phone]  Fax: [phone]     PATIENT NAME: Oralia Woodruff                                                                          YOB: 1945           DATE OF SERVICE: 07/31/2025  FACILITY:  [facility]    CHIEF COMPLAINT:  Nursing facility admission    History of Present Illness  The patient is an 79-year-old female with a history of hypertension, DVTs, primary hyperparathyroidism, osteoporosis, and a pancreatic mass. She was recently hospitalized at [facility] from 07/16/2025 to 07/22/2025 for concerns of left leg pain after suffering a fall. She was found to have a pathologic left proximal tibia shaft fracture. Orthopedics recommended cast placement and non-weightbearing status for the left lower extremity. She was also noted to be hypercalcemic, and endocrinology assisted with management while in the hospital. She refused further management while in the hospital and due to debility and weakness, was then transferred to this facility for further care and rehabilitation. She remained full code by the time of discharge from [facility].    She reports that her cast feels tight and restrictive, making it difficult for her to lift her leg during therapy. She has not undergone surgery for this issue. She has an upcoming appointment next week to check her leg.    She expresses a desire to understand her condition fully before considering surgery. She has been informed that her atrial fibrillation is worsening. She has never opposed surgery but is now contemplating the long-term effects of hyperparathyroidism on her body. She recalls undergoing an ultrasound, which did not reveal any cancer markers. She does not have a primary care physician as her insurance does not require one. She mentions that her children are advocating for surgery, but she feels uncertain about the best  course of action. She is currently experiencing significant pain.       PAST MEDICAL & SURGICAL HISTORY:   Past Medical History:   Diagnosis Date    Acute embolism and thrombosis of unspecified deep veins of unspecified lower extremity     Age-related osteoporosis without current pathological fracture     Benign neoplasm of parathyroid gland     Chronic low back pain     Closed fracture of proximal end of left tibia, unspecified fracture morphology, initial encounter     Degenerative joint disease (DJD) of lumbar spine     Disease of pancreas, unspecified     Diverticulosis     Diverticulosis of large intestine without perforation or abscess without bleeding     Failure to thrive in adult     Fall at home     Fracture,proximal tibia shaft     Hypercalcemia     Hyperkalemia     Hypertension     Liver cyst     Lytic bone lesion of femur     Mild protein-calorie malnutrition     Normocytic anemia     Osteoarthritis, hip, bilateral     Osteopenia     Other specified disorders of bone, unspecified site     Pancreatic lesion     Parathyroid adenoma     Primary hyperparathyroidism     Renal insufficiency     Thyroid nodule     Transaminitis     Unintentional weight loss       Past Surgical History:   Procedure Laterality Date    CHOLECYSTECTOMY      EYE SURGERY      HYSTERECTOMY           MEDICATIONS:  I have reviewed and reconciled the patients medication list in the patients chart at the skilled nursing facility on 07/31/2025.      ALLERGIES:  Allergies   Allergen Reactions    Donepezil Unknown - High Severity    Imipramine Unknown - High Severity    Iodine Unknown - High Severity     NO IDEA      Methylphenidate Derivatives Unknown - High Severity    Risperidone Unknown - High Severity         SOCIAL HISTORY:  Social History     Socioeconomic History    Marital status:    Tobacco Use    Smoking status: Never    Smokeless tobacco: Never   Vaping Use    Vaping status: Never Used   Substance and Sexual Activity     "Alcohol use: No    Drug use: No    Sexual activity: Defer       FAMILY HISTORY:  Family History   Problem Relation Age of Onset    Stroke Father         REVIEW OF SYSTEMS:  Review of Systems   Constitutional:  Negative for chills, fatigue and fever.   HENT:  Negative for congestion, ear pain, rhinorrhea, sinus pressure and sore throat.    Eyes:  Negative for visual disturbance.   Respiratory:  Negative for cough, chest tightness, shortness of breath and wheezing.    Cardiovascular:  Negative for chest pain, palpitations and leg swelling.   Gastrointestinal:  Negative for abdominal pain, blood in stool, constipation, diarrhea, nausea and vomiting.   Endocrine: Negative for polydipsia and polyuria.   Genitourinary:  Negative for dysuria and hematuria.   Musculoskeletal:  Negative for arthralgias and back pain.   Skin:  Negative for rash.   Neurological:  Negative for dizziness, light-headedness, numbness and headaches.   Psychiatric/Behavioral:  Negative for dysphoric mood and sleep disturbance. The patient is not nervous/anxious.        PHYSICAL EXAMINATION:   VITAL SIGNS: /73   Pulse 74   Temp 98 °F (36.7 °C)   Resp 16   Ht 160 cm (63\")   Wt 48.5 kg (107 lb)   SpO2 97%   BMI 18.95 kg/m²     Physical Exam  Vitals and nursing note reviewed.   Constitutional:       Appearance: Normal appearance. She is well-developed.   HENT:      Head: Normocephalic and atraumatic.      Nose: Nose normal.      Mouth/Throat:      Mouth: Mucous membranes are moist.      Pharynx: No oropharyngeal exudate.   Eyes:      General: No scleral icterus.     Conjunctiva/sclera: Conjunctivae normal.      Pupils: Pupils are equal, round, and reactive to light.   Neck:      Thyroid: No thyromegaly.   Cardiovascular:      Rate and Rhythm: Normal rate and regular rhythm.      Heart sounds: Normal heart sounds. No murmur heard.     No friction rub. No gallop.   Pulmonary:      Effort: Pulmonary effort is normal. No respiratory distress. " "     Breath sounds: Normal breath sounds. No wheezing.   Abdominal:      General: Bowel sounds are normal. There is no distension.      Palpations: Abdomen is soft.      Tenderness: There is no abdominal tenderness.   Musculoskeletal:         General: Deformity (LLE in splint) present. No signs of injury.      Cervical back: Normal range of motion and neck supple.   Lymphadenopathy:      Cervical: No cervical adenopathy.   Skin:     General: Skin is warm and dry.      Findings: No rash.   Neurological:      Mental Status: She is alert and oriented to person, place, and time.   Psychiatric:         Mood and Affect: Mood normal.         Behavior: Behavior normal.         RECORDS REVIEW:   Discharge Summary [facility] 7/22/25  Results  Labs   - CMP: 07/30/2025, BUN 27, calcium 12.3, creatinine 1.46, potassium 4.2, sodium 142   - PTH: 07/30/2025, 1982   - Vitamin D level: 07/30/2025, 37   - Vitamin B12: 07/30/2025, 842   - Calcium: 11.4 at the time of discharge from [facility]    ASSESSMENT   Diagnoses and all orders for this visit:    1. Hyperparathyroidism (Primary)    2. Pancreatic mass    3. Moderate protein-calorie malnutrition    4. Hypercalcemia        Assessment & Plan  Primary hyperparathyroidism.  - Lab work from 07/30/2025 shows calcium 12.3 and PTH 1982; calcium was 11.4 at discharge from [facility].  - IVF given in facility to help control Ca but unfortunately, levels remain high.  * extensive discussion with patient and family was held today. Family encourages patient to seek definitive treatment but the patient was hesitant about aggressive interventions considering risk.  Essentially being \"full code\" and not being able to control calcium levels in the facility does not make sense.  Patient understood that it was wise to either seek hospice care or to take a reasonable chance at addressing her hyperparathyroidism with appropriate work up and possible surgery. With her current condition, it is best to " complete work up and consider parathyroidectomy as inpatient.  Calcium cannot be monitored as frequently needed to initiate calcitonin or bisphosphinate therapy.   - If calcium levels rise above 13 or significantly higher, she will be sent to the hospital for immediate intervention.  - today patient agreed to further work up for parathyroid. Likely plan for transfer to ER tomorrow.     Left proximal tibial shaft fracture.  - Stable with current supportive care in the nursing facility.  - splint is in place.  - Follow-up with orthopedics as scheduled.    Lytic lesions of the femur and tibia.  - secondary to hyperparathyroidism/ parathyroid adenoma.   - cont supportive care.  Consider bisphosphonate therapy.    Atrial fibrillation.  - stable at this time.   - High calcium levels may be contributing.  - Advised to follow up with cardiologist for further management.     67 min spent with direct patient care, review of medical chart, discussion with nursing staff, and medical decision making.       [x]  Discussed Patient in detail with nursing/staff, addressed all needs today.     [x]  Plan of Care Reviewed   [x]  PT/OT Reviewed   [x]  Order Changes  []  Discharge Plans Reviewed  [x]  Advance Directive on file with Nursing Home.   [x]  POA on file with Nursing Home.    [x]  Code Status listed and reviewed.     Gerry Ruth DO.  7/31/2025      **Part of this note may be an electronic transcription/translation of spoken language to printed text using the Dragon Dictation System.**    Patient or patient representative verbalized consent for the use of Ambient Listening during the visit with  Gerry Ruth DO for chart documentation. 7/31/2025  16:29 EDT

## 2025-07-31 ENCOUNTER — NURSING HOME (OUTPATIENT)
Dept: INTERNAL MEDICINE | Facility: CLINIC | Age: 80
End: 2025-07-31
Payer: MEDICARE

## 2025-07-31 VITALS
TEMPERATURE: 98 F | DIASTOLIC BLOOD PRESSURE: 73 MMHG | OXYGEN SATURATION: 97 % | HEIGHT: 63 IN | WEIGHT: 107 LBS | BODY MASS INDEX: 18.96 KG/M2 | RESPIRATION RATE: 16 BRPM | HEART RATE: 74 BPM | SYSTOLIC BLOOD PRESSURE: 130 MMHG

## 2025-07-31 DIAGNOSIS — E44.0 MODERATE PROTEIN-CALORIE MALNUTRITION: ICD-10-CM

## 2025-07-31 DIAGNOSIS — E21.3 HYPERPARATHYROIDISM: Primary | ICD-10-CM

## 2025-07-31 DIAGNOSIS — K86.89 PANCREATIC MASS: ICD-10-CM

## 2025-07-31 DIAGNOSIS — E83.52 HYPERCALCEMIA: ICD-10-CM

## 2025-07-31 NOTE — LETTER
Nursing Home History and Physical       Gerry Ruth DO  [address] [place] [address] Phone: [phone]  Fax: [phone]     PATIENT NAME: Oralia Woodruff                                                                          YOB: 1945           DATE OF SERVICE: 07/31/2025  FACILITY:  [facility]    CHIEF COMPLAINT:  Nursing facility admission    History of Present Illness  The patient is an 79-year-old female with a history of hypertension, DVTs, primary hyperparathyroidism, osteoporosis, and a pancreatic mass. She was recently hospitalized at [facility] from 07/16/2025 to 07/22/2025 for concerns of left leg pain after suffering a fall. She was found to have a pathologic left proximal tibia shaft fracture. Orthopedics recommended cast placement and non-weightbearing status for the left lower extremity. She was also noted to be hypercalcemic, and endocrinology assisted with management while in the hospital. She refused further management while in the hospital and due to debility and weakness, was then transferred to this facility for further care and rehabilitation. She remained full code by the time of discharge from [facility].    She reports that her cast feels tight and restrictive, making it difficult for her to lift her leg during therapy. She has not undergone surgery for this issue. She has an upcoming appointment next week to check her leg.    She expresses a desire to understand her condition fully before considering surgery. She has been informed that her atrial fibrillation is worsening. She has never opposed surgery but is now contemplating the long-term effects of hyperparathyroidism on her body. She recalls undergoing an ultrasound, which did not reveal any cancer markers. She does not have a primary care physician as her insurance does not require one. She mentions that her children are advocating for surgery, but she feels uncertain about the best  course of action. She is currently experiencing significant pain.       PAST MEDICAL & SURGICAL HISTORY:   Past Medical History:   Diagnosis Date   • Acute embolism and thrombosis of unspecified deep veins of unspecified lower extremity    • Age-related osteoporosis without current pathological fracture    • Benign neoplasm of parathyroid gland    • Chronic low back pain    • Closed fracture of proximal end of left tibia, unspecified fracture morphology, initial encounter    • Degenerative joint disease (DJD) of lumbar spine    • Disease of pancreas, unspecified    • Diverticulosis    • Diverticulosis of large intestine without perforation or abscess without bleeding    • Failure to thrive in adult    • Fall at home    • Fracture,proximal tibia shaft    • Hypercalcemia    • Hyperkalemia    • Hypertension    • Liver cyst    • Lytic bone lesion of femur    • Mild protein-calorie malnutrition    • Normocytic anemia    • Osteoarthritis, hip, bilateral    • Osteopenia    • Other specified disorders of bone, unspecified site    • Pancreatic lesion    • Parathyroid adenoma    • Primary hyperparathyroidism    • Renal insufficiency    • Thyroid nodule    • Transaminitis    • Unintentional weight loss       Past Surgical History:   Procedure Laterality Date   • CHOLECYSTECTOMY     • EYE SURGERY     • HYSTERECTOMY           MEDICATIONS:  I have reviewed and reconciled the patients medication list in the patients chart at the [facility] nursing [facility] on 07/31/2025.      ALLERGIES:  Allergies   Allergen Reactions   • Donepezil Unknown - High Severity   • Imipramine Unknown - High Severity   • Iodine Unknown - High Severity     NO IDEA     • Methylphenidate Derivatives Unknown - High Severity   • Risperidone Unknown - High Severity         SOCIAL HISTORY:  Social History     Socioeconomic History   • Marital status:    Tobacco Use   • Smoking status: Never   • Smokeless tobacco: Never   Vaping Use   • Vaping status: Never  "Used   Substance and Sexual Activity   • Alcohol use: No   • Drug use: No   • Sexual activity: Defer       FAMILY HISTORY:  Family History   Problem Relation Age of Onset   • Stroke Father         REVIEW OF SYSTEMS:  Review of Systems   Constitutional:  Negative for chills, fatigue and fever.   HENT:  Negative for congestion, ear pain, rhinorrhea, sinus pressure and sore throat.    Eyes:  Negative for visual disturbance.   Respiratory:  Negative for cough, chest tightness, shortness of breath and wheezing.    Cardiovascular:  Negative for chest pain, palpitations and leg swelling.   Gastrointestinal:  Negative for abdominal pain, blood in stool, constipation, diarrhea, nausea and vomiting.   Endocrine: Negative for polydipsia and polyuria.   Genitourinary:  Negative for dysuria and hematuria.   Musculoskeletal:  Negative for arthralgias and back pain.   Skin:  Negative for rash.   Neurological:  Negative for dizziness, light-headedness, numbness and headaches.   Psychiatric/Behavioral:  Negative for dysphoric mood and sleep disturbance. The patient is not nervous/anxious.        PHYSICAL EXAMINATION:   VITAL SIGNS: /73   Pulse 74   Temp 98 °F (36.7 °C)   Resp 16   Ht 160 cm (63\")   Wt 48.5 kg (107 lb)   SpO2 97%   BMI 18.95 kg/m²     Physical Exam  Vitals and nursing note reviewed.   Constitutional:       Appearance: Normal appearance. She is well-developed.   HENT:      Head: Normocephalic and atraumatic.      Nose: Nose normal.      Mouth/Throat:      Mouth: Mucous membranes are moist.      Pharynx: No oropharyngeal exudate.   Eyes:      General: No scleral icterus.     Conjunctiva/sclera: Conjunctivae normal.      Pupils: Pupils are equal, round, and reactive to light.   Neck:      Thyroid: No thyromegaly.   Cardiovascular:      Rate and Rhythm: Normal rate and regular rhythm.      Heart sounds: Normal heart sounds. No murmur heard.     No friction rub. No gallop.   Pulmonary:      Effort: Pulmonary " "effort is normal. No respiratory distress.      Breath sounds: Normal breath sounds. No wheezing.   Abdominal:      General: Bowel sounds are normal. There is no distension.      Palpations: Abdomen is soft.      Tenderness: There is no abdominal tenderness.   Musculoskeletal:         General: Deformity (LLE in splint) present. No signs of injury.      Cervical back: Normal range of motion and neck supple.   Lymphadenopathy:      Cervical: No cervical adenopathy.   Skin:     General: Skin is warm and dry.      Findings: No rash.   Neurological:      Mental Status: She is alert and oriented to person, place, and time.   Psychiatric:         Mood and Affect: Mood normal.         Behavior: Behavior normal.         RECORDS REVIEW:   Discharge Summary [facility] 7/22/25  Results  Labs   - CMP: 07/30/2025, BUN 27, calcium 12.3, creatinine 1.46, potassium 4.2, sodium 142   - PTH: 07/30/2025, 1982   - Vitamin D level: 07/30/2025, 37   - Vitamin B12: 07/30/2025, 842   - Calcium: 11.4 at the time of discharge from [facility]    ASSESSMENT   Diagnoses and all orders for this visit:    1. Hyperparathyroidism (Primary)    2. Pancreatic mass    3. Moderate protein-calorie malnutrition    4. Hypercalcemia        Assessment & Plan  Primary hyperparathyroidism.  - Lab work from 07/30/2025 shows calcium 12.3 and PTH 1982; calcium was 11.4 at discharge from [facility].  - IVF given in facility to help control Ca but unfortunately, levels remain high.  * extensive discussion with patient and family was held today. Family encourages patient to seek definitive treatment but the patient was hesitant about aggressive interventions considering risk.  Essentially being \"full code\" and not being able to control calcium levels in the facility does not make sense.  Patient understood that it was wise to either seek hospice care or to take a reasonable chance at addressing her hyperparathyroidism with appropriate work up and possible surgery. " With her current condition, it is best to complete work up and consider parathyroidectomy as inpatient.  Calcium cannot be monitored as frequently needed to initiate calcitonin or bisphosphinate therapy.   - If calcium levels rise above 13 or significantly higher, she will be sent to the hospital for immediate intervention.  - today patient agreed to further work up for parathyroid. Likely plan for transfer to ER tomorrow.     Left proximal tibial shaft fracture.  - Stable with current supportive care in the nursing facility.  - splint is in place.  - Follow-up with orthopedics as scheduled.    Lytic lesions of the femur and tibia.  - secondary to hyperparathyroidism/ parathyroid adenoma.   - cont supportive care.  Consider bisphosphonate therapy.    Atrial fibrillation.  - stable at this time.   - High calcium levels may be contributing.  - Advised to follow up with cardiologist for further management.     67 min spent with direct patient care, review of medical chart, discussion with nursing staff, and medical decision making.       [x]  Discussed Patient in detail with nursing/staff, addressed all needs today.     [x]  Plan of Care Reviewed   [x]  PT/OT Reviewed   [x]  Order Changes  []  Discharge Plans Reviewed  [x]  Advance Directive on file with Nursing Home.   [x]  POA on file with Nursing Home.    [x]  Code Status listed and reviewed.     Gerry Ruth DO.  7/31/2025      **Part of this note may be an electronic transcription/translation of spoken language to printed text using the Dragon Dictation System.**    Patient or patient representative verbalized consent for the use of Ambient Listening during the visit with  Gerry Ruth DO for chart documentation. 7/31/2025  16:29 EDT